# Patient Record
Sex: FEMALE | Race: WHITE | NOT HISPANIC OR LATINO | Employment: PART TIME | URBAN - METROPOLITAN AREA
[De-identification: names, ages, dates, MRNs, and addresses within clinical notes are randomized per-mention and may not be internally consistent; named-entity substitution may affect disease eponyms.]

---

## 2017-03-17 ENCOUNTER — OFFICE VISIT (OUTPATIENT)
Dept: MEDICAL GROUP | Age: 52
End: 2017-03-17
Payer: COMMERCIAL

## 2017-03-17 VITALS
BODY MASS INDEX: 28.04 KG/M2 | OXYGEN SATURATION: 98 % | SYSTOLIC BLOOD PRESSURE: 100 MMHG | DIASTOLIC BLOOD PRESSURE: 72 MMHG | WEIGHT: 185 LBS | HEART RATE: 87 BPM | TEMPERATURE: 97.5 F | HEIGHT: 68 IN

## 2017-03-17 DIAGNOSIS — R73.01 IFG (IMPAIRED FASTING GLUCOSE): ICD-10-CM

## 2017-03-17 DIAGNOSIS — K90.0 CELIAC SPRUE: ICD-10-CM

## 2017-03-17 DIAGNOSIS — E55.9 VITAMIN D DEFICIENCY: ICD-10-CM

## 2017-03-17 DIAGNOSIS — E03.9 HYPOTHYROIDISM, UNSPECIFIED TYPE: ICD-10-CM

## 2017-03-17 DIAGNOSIS — Z86.2 H/O IRON DEFICIENCY ANEMIA: ICD-10-CM

## 2017-03-17 DIAGNOSIS — Z79.899 HIGH RISK MEDICATION USE: ICD-10-CM

## 2017-03-17 DIAGNOSIS — Z13.220 SCREENING, LIPID: ICD-10-CM

## 2017-03-17 DIAGNOSIS — Z90.49 S/P TOTAL COLECTOMY: ICD-10-CM

## 2017-03-17 PROCEDURE — 99214 OFFICE O/P EST MOD 30 MIN: CPT | Performed by: FAMILY MEDICINE

## 2017-03-17 RX ORDER — LEVOTHYROXINE SODIUM 0.07 MG/1
TABLET ORAL
COMMUNITY
End: 2017-05-01

## 2017-03-17 ASSESSMENT — PATIENT HEALTH QUESTIONNAIRE - PHQ9: CLINICAL INTERPRETATION OF PHQ2 SCORE: 0

## 2017-03-17 NOTE — PROGRESS NOTES
Subjective:     Chief Complaint   Patient presents with   • Hypothyroidism     Evaluation      Shama Paula is a 52 y.o. female here today for issues listed below      1. Hypothyroidism, unspecified type - patient stopped taking thyroid medication around January because she was experiencing palpitations and was having fatigue. No change in fatigue with cessation of thyroid. Palpitations did resolve. She was previously on 75 µg of thyroid daily and about 2015. After that dose was increased. She was overtreated at 112 µg daily. Previous recommendation was to take 112 µg 6-1/2 days per week. She denies temperature intolerance. She has chronic bowel problems. Reports ongoing fatigue    2. Celiac sprue chronic condition. Has difficulty adhering to diet and notes no improvement whether she appears to diet or not. Is at risk for an deficiency because of celiac    3. H/O iron deficiency anemia . Denies any known bleeding. No vaginal bleeding because of hysterectomy. Has occasional blood in stool but not regularly.    4. S/P total colectomy for chronic bowel problems and celiac disease. No changes in bowels    5. IFG (impaired fasting glucose) identified on prior labs. No excessive thirst or urination. Not following a specific diet other than mostly gluten-free    6. Screening, lipid . Due for labs.    7. Vitamin D deficiency . Not taking supplement consistently.    8. High risk medication use- at risk for B12 deficiency because of hypothyroid.        Allergies: Dulcolax; Gluten meal; and Nkda  Current medicines none. Previously on levothyroxine 75 µg in 2015 and levothyroxine 112 µg in 2016  Current Outpatient Prescriptions   Medication Sig Dispense Refill   • levothyroxine (SYNTHROID) 75 MCG Tab Take  by mouth.       No current facility-administered medications for this visit.       She  has a past medical history of Tubal ligation; Celiac disease; Pancreatitis; Heart palpitations; Celiac sprue (5/12/2009); Colon polyp  "(5/12/2009); Preventative health care (5/12/2009); Anemia (5/12/2009); ENDOMETRIOSIS (5/12/2009); Ovarian cyst (5/12/2009); Recurrent UTI (5/12/2009); Pancreatic cyst (6/17/2009); Elevated TSH (12/8/2010); and Atrophic vaginitis (8/25/2015).  Health Maintenance: Mammogram is current  She is not exercising regularly   ROS  Constitutional: Negative for fever, chills/sweats   Respiratory: Negative for shortness of breath, BROWN  Cardiovascular: Negative for chest pain or pressure  GI/: Negative for urinary difficulty  All other systems reviewed and are negative except as per HPI.        Objective:     Blood pressure 100/72, pulse 87, temperature 36.4 °C (97.5 °F), height 1.727 m (5' 7.99\"), weight 83.915 kg (185 lb), last menstrual period 12/06/2012, SpO2 98 %. Body mass index is 28.14 kg/(m^2).  Physical Exam:  Alert, oriented in no acute distress.  Eye contact is good, speech goal directed, affect calm  HEENT: conjunctiva non-injected, sclera non-icteric.  Pinna normal without skin lesions. TM pearly gray.   Oral mucous membranes pink and moist with no lesions.  Neck No adenopathy or masses in the neck or supraclavicular regions.  No carotid bruits. No thyromegaly  Lungs: clear to auscultation bilaterally with good excursion.  CV: regular rate and rhythm.  Abdomen No CVAT  Ext: no edema, no tenderness to palpation over shins        Assessment and Plan:     Long discussion with patient. Discussed the palpitations were more likely due to myasthenia. Recommended in future stopping one medication at a time and communicating with provider prior to stopping any medication  Treatment Changes: Labs ordered. Will treat as indicated based on results.  Shama was seen today for hypothyroidism.    Diagnoses and all orders for this visit:    Hypothyroidism, unspecified type  Comments:  if TSH elevated, start thyroid 75 mcg daily and titrate from there. (overtreated on 112 mcg 6.5 days per week)  Orders:  -     TSH; Future  -     " FREE THYROXINE; Future    Celiac sprue  Comments:  Continue diet.   Orders:  -     FERRITIN; Future    H/O iron deficiency anemia  Comments:  at risk for chronic deficiency from celiac dz. check levels.  Orders:  -     CBC WITH DIFFERENTIAL; Future    S/P total colectomy    IFG (impaired fasting glucose)  Comments:  Monitor labs  Orders:  -     COMP METABOLIC PANEL; Future  -     INSULIN FASTING; Future  -     HEMOGLOBIN A1C; Future    Screening, lipid  Comments:  lab ordered  Orders:  -     LIPID PROFILE; Future    Vitamin D deficiency  Comments:  lab ordered  Orders:  -     VITAMIN D,25 HYDROXY; Future    High risk medication use  Comments:  risk for B12 defic from hypothyroid. Check labs  Orders:  -     MAGNESIUM; Future        Followup: Return in about 1 year (around 3/17/2018). sooner should new symptoms or problems arise.

## 2017-03-17 NOTE — MR AVS SNAPSHOT
"        Shama Paula   3/17/2017 7:30 AM   Office Visit   MRN: [id number]    Department:  [address] [place] [address]   Dept Phone:  [phone]    Description:  Female : 1965   Provider:  Luana Rm M.D.           Reason for Visit     Hypothyroidism Evaluation       Allergies as of 3/17/2017     Allergen Noted Reactions    Dulcolax 2012       Headaches from pill form medication    Gluten Meal 2009       Nkda [No Known Drug Allergy] 2009         You were diagnosed with     Hypothyroidism, unspecified type   [0356319]   if TSH elevated, start thyroid 75 mcg daily and titrate from there. (overtreated on 112 mcg 6.5 days per week)    Celiac sprue   [[id number]]   Continue diet.     H/O iron deficiency anemia   [813825]   at risk for chronic deficiency from celiac dz. check levels.    S/P total colectomy   [831970]       IFG (impaired fasting glucose)   [875311]   Monitor labs    Screening, lipid   [638459]   lab ordered    Vitamin D deficiency   [0125695]   lab ordered    High risk medication use   [388014]   risk for B12 defic from hypothyroid. Check labs      Vital Signs     Blood Pressure Pulse Temperature Height Weight Body Mass Index    100/72 mmHg 87 36.4 °C (97.5 °F) 1.727 m (5' 7.99\") 83.915 kg (185 lb) 28.14 kg/m2    Oxygen Saturation Last Menstrual Period Smoking Status             98% 2012 Former Smoker         Basic Information     Date Of Birth Sex Race Ethnicity Preferred Language    1965 Female White Non- English      Your appointments     2017  8:10 AM   Established Patient with Luana Rm M.D.   [address])    [address] [place] [id number] [phone]           You will be receiving a confirmation call a few days before your appointment from our automated call confirmation system.              Problem List              ICD-10-CM Priority Class Noted - Resolved    Anemia D64.9   2009 - Present    Celiac " sprue K90.0   5/12/2009 - Present    Recurrent UTI N39.0   5/12/2009 - Present    Colon polyp K63.5   5/12/2009 - Present    Preventative health care Z00.00   5/12/2009 - Present    History of pancreatitis Z87.19   5/12/2009 - Present    Pancreatic cyst K86.2   6/17/2009 - Present    Chronic constipation K59.09   7/15/2010 - Present    Hypothyroid E03.9   12/8/2010 - Present    S/P total hysterectomy and bilateral salpingo-oophorectomy Z90.710, Z90.79, Z90.722   3/23/2014 - Present    S/P total colectomy Z90.49   10/17/2014 - Present    Atrophic vaginitis N95.2   8/25/2015 - Present      Health Maintenance        Date Due Completion Dates    IMM DTaP/Tdap/Td Vaccine (1 - Tdap) 2/8/1986 ---    IMM INFLUENZA (1) 9/1/2016 11/2/2015, 9/5/2014, 10/20/2013, 9/27/2013    MAMMOGRAM 8/18/2017 8/18/2016, 5/6/2014, 9/22/2008, 9/22/2008            Current Immunizations     Influenza TIV (IM) 10/20/2013    Influenza Vaccine Quad Inj (Pf) 11/2/2015, 9/5/2014, 9/27/2013      Below and/or attached are the medications your provider expects you to take. Review all of your home medications and newly ordered medications with your provider and/or pharmacist. Follow medication instructions as directed by your provider and/or pharmacist. Please keep your medication list with you and share with your provider. Update the information when medications are discontinued, doses are changed, or new medications (including over-the-counter products) are added; and carry medication information at all times in the event of emergency situations     Allergies:  DULCOLAX - (reactions not documented)     GLUTEN MEAL - (reactions not documented)     NKDA - (reactions not documented)               Medications  Valid as of: March 17, 2017 -  9:52 AM    Generic Name Brand Name Tablet Size Instructions for use    Levothyroxine Sodium (Tab) SYNTHROID 75 MCG Take  by mouth.        .                 Medicines prescribed today were sent to:     [id number]  - [place], [place] - [address]    [address] [place] [address]    Phone: [phone] Fax: [phone]    Open 24 Hours?: No      Medication refill instructions:       If your prescription bottle indicates you have medication refills left, it is not necessary to call your provider’s office. Please contact your pharmacy and they will refill your medication.    If your prescription bottle indicates you do not have any refills left, you may request refills at any time through one of the following ways: The online [employer] system (except Urgent Care), by calling your provider’s office, or by asking your pharmacy to contact your provider’s office with a refill request. Medication refills are processed only during regular business hours and may not be available until the next business day. Your provider may request additional information or to have a follow-up visit with you prior to refilling your medication.   *Please Note: Medication refills are assigned a new Rx number when refilled electronically. Your pharmacy may indicate that no refills were authorized even though a new prescription for the same medication is available at the pharmacy. Please request the medicine by name with the pharmacy before contacting your provider for a refill.        Your To Do List     Future Labs/Procedures Complete By Expires    CBC WITH DIFFERENTIAL  As directed 3/18/2018    COMP METABOLIC PANEL  As directed 3/18/2018    FERRITIN  As directed 3/17/2018    FREE THYROXINE  As directed 3/18/2018    HEMOGLOBIN A1C  As directed 3/18/2018    INSULIN FASTING  As directed 3/17/2018    LIPID PROFILE  As directed 3/18/2018    MAGNESIUM  As directed 3/17/2018    TSH  As directed 3/18/2018    VITAMIN D,25 HYDROXY  As directed 3/18/2018      Instructions    Take OTC Vitamin B12 500 IU minimum every day.            [employer] Access Code: Activation code not generated  Current [employer] Status: Active

## 2017-03-22 ENCOUNTER — HOSPITAL ENCOUNTER (OUTPATIENT)
Dept: LAB | Facility: MEDICAL CENTER | Age: 52
End: 2017-03-22
Attending: FAMILY MEDICINE
Payer: COMMERCIAL

## 2017-03-22 DIAGNOSIS — E03.9 HYPOTHYROIDISM, UNSPECIFIED TYPE: ICD-10-CM

## 2017-03-22 DIAGNOSIS — Z13.220 SCREENING, LIPID: ICD-10-CM

## 2017-03-22 DIAGNOSIS — Z86.2 H/O IRON DEFICIENCY ANEMIA: ICD-10-CM

## 2017-03-22 DIAGNOSIS — E55.9 VITAMIN D DEFICIENCY: ICD-10-CM

## 2017-03-22 DIAGNOSIS — K90.0 CELIAC SPRUE: ICD-10-CM

## 2017-03-22 DIAGNOSIS — Z79.899 HIGH RISK MEDICATION USE: ICD-10-CM

## 2017-03-22 DIAGNOSIS — R73.01 IFG (IMPAIRED FASTING GLUCOSE): ICD-10-CM

## 2017-03-22 LAB
25(OH)D3 SERPL-MCNC: 23 NG/ML (ref 30–100)
ALBUMIN SERPL BCP-MCNC: 3.9 G/DL (ref 3.2–4.9)
ALBUMIN/GLOB SERPL: 1.5 G/DL
ALP SERPL-CCNC: 62 U/L (ref 30–99)
ALT SERPL-CCNC: 10 U/L (ref 2–50)
ANION GAP SERPL CALC-SCNC: 5 MMOL/L (ref 0–11.9)
AST SERPL-CCNC: 17 U/L (ref 12–45)
BASOPHILS # BLD AUTO: 1 % (ref 0–1.8)
BASOPHILS # BLD: 0.05 K/UL (ref 0–0.12)
BILIRUB SERPL-MCNC: 0.5 MG/DL (ref 0.1–1.5)
BUN SERPL-MCNC: 17 MG/DL (ref 8–22)
CALCIUM SERPL-MCNC: 9 MG/DL (ref 8.4–10.2)
CHLORIDE SERPL-SCNC: 105 MMOL/L (ref 96–112)
CHOLEST SERPL-MCNC: 143 MG/DL (ref 100–199)
CO2 SERPL-SCNC: 28 MMOL/L (ref 20–33)
CREAT SERPL-MCNC: 0.88 MG/DL (ref 0.5–1.4)
EOSINOPHIL # BLD AUTO: 0.19 K/UL (ref 0–0.51)
EOSINOPHIL NFR BLD: 3.9 % (ref 0–6.9)
ERYTHROCYTE [DISTWIDTH] IN BLOOD BY AUTOMATED COUNT: 42.2 FL (ref 35.9–50)
EST. AVERAGE GLUCOSE BLD GHB EST-MCNC: 117 MG/DL
FERRITIN SERPL-MCNC: 4.1 NG/ML (ref 10–291)
GFR SERPL CREATININE-BSD FRML MDRD: >60 ML/MIN/1.73 M 2
GLOBULIN SER CALC-MCNC: 2.6 G/DL (ref 1.9–3.5)
GLUCOSE SERPL-MCNC: 95 MG/DL (ref 65–99)
HBA1C MFR BLD: 5.7 % (ref 0–5.6)
HCT VFR BLD AUTO: 34.7 % (ref 37–47)
HDLC SERPL-MCNC: 62 MG/DL
HGB BLD-MCNC: 10.9 G/DL (ref 12–16)
IMM GRANULOCYTES # BLD AUTO: 0.01 K/UL (ref 0–0.11)
IMM GRANULOCYTES NFR BLD AUTO: 0.2 % (ref 0–0.9)
LDLC SERPL CALC-MCNC: 69 MG/DL
LYMPHOCYTES # BLD AUTO: 1.34 K/UL (ref 1–4.8)
LYMPHOCYTES NFR BLD: 27.4 % (ref 22–41)
MAGNESIUM SERPL-MCNC: 2.1 MG/DL (ref 1.5–2.5)
MCH RBC QN AUTO: 24.7 PG (ref 27–33)
MCHC RBC AUTO-ENTMCNC: 31.4 G/DL (ref 33.6–35)
MCV RBC AUTO: 78.5 FL (ref 81.4–97.8)
MONOCYTES # BLD AUTO: 0.5 K/UL (ref 0–0.85)
MONOCYTES NFR BLD AUTO: 10.2 % (ref 0–13.4)
NEUTROPHILS # BLD AUTO: 2.8 K/UL (ref 2–7.15)
NEUTROPHILS NFR BLD: 57.3 % (ref 44–72)
NRBC # BLD AUTO: 0 K/UL
NRBC BLD AUTO-RTO: 0 /100 WBC
PLATELET # BLD AUTO: 249 K/UL (ref 164–446)
PMV BLD AUTO: 10.4 FL (ref 9–12.9)
POTASSIUM SERPL-SCNC: 3.8 MMOL/L (ref 3.6–5.5)
PROT SERPL-MCNC: 6.5 G/DL (ref 6–8.2)
RBC # BLD AUTO: 4.42 M/UL (ref 4.2–5.4)
SODIUM SERPL-SCNC: 138 MMOL/L (ref 135–145)
T4 FREE SERPL-MCNC: 0.75 NG/DL (ref 0.58–1.64)
TRIGL SERPL-MCNC: 61 MG/DL (ref 0–149)
TSH SERPL DL<=0.005 MIU/L-ACNC: 7.48 UIU/ML (ref 0.35–5.5)
WBC # BLD AUTO: 4.9 K/UL (ref 4.8–10.8)

## 2017-03-22 PROCEDURE — 83036 HEMOGLOBIN GLYCOSYLATED A1C: CPT

## 2017-03-22 PROCEDURE — 36415 COLL VENOUS BLD VENIPUNCTURE: CPT

## 2017-03-22 PROCEDURE — 84443 ASSAY THYROID STIM HORMONE: CPT

## 2017-03-22 PROCEDURE — 84439 ASSAY OF FREE THYROXINE: CPT

## 2017-03-22 PROCEDURE — 80061 LIPID PANEL: CPT

## 2017-03-22 PROCEDURE — 82306 VITAMIN D 25 HYDROXY: CPT

## 2017-03-22 PROCEDURE — 85025 COMPLETE CBC W/AUTO DIFF WBC: CPT

## 2017-03-22 PROCEDURE — 83525 ASSAY OF INSULIN: CPT

## 2017-03-22 PROCEDURE — 82728 ASSAY OF FERRITIN: CPT

## 2017-03-22 PROCEDURE — 80053 COMPREHEN METABOLIC PANEL: CPT

## 2017-03-22 PROCEDURE — 83735 ASSAY OF MAGNESIUM: CPT

## 2017-03-23 LAB — INSULIN P FAST SERPL-ACNC: 10 UIU/ML (ref 3–19)

## 2017-05-01 ENCOUNTER — PATIENT MESSAGE (OUTPATIENT)
Dept: MEDICAL GROUP | Facility: PHYSICIAN GROUP | Age: 52
End: 2017-05-01

## 2017-05-01 DIAGNOSIS — E03.9 HYPOTHYROIDISM, UNSPECIFIED TYPE: ICD-10-CM

## 2017-05-01 RX ORDER — LEVOTHYROXINE SODIUM 0.05 MG/1
50 TABLET ORAL
Qty: 90 TAB | Refills: 3 | Status: SHIPPED | OUTPATIENT
Start: 2017-05-01 | End: 2018-12-03 | Stop reason: CLARIF

## 2017-05-09 ENCOUNTER — OFFICE VISIT (OUTPATIENT)
Dept: MEDICAL GROUP | Facility: LAB | Age: 52
End: 2017-05-09
Payer: COMMERCIAL

## 2017-05-09 VITALS
HEART RATE: 74 BPM | BODY MASS INDEX: 28.19 KG/M2 | OXYGEN SATURATION: 96 % | DIASTOLIC BLOOD PRESSURE: 76 MMHG | SYSTOLIC BLOOD PRESSURE: 118 MMHG | RESPIRATION RATE: 16 BRPM | WEIGHT: 186 LBS | HEIGHT: 68 IN | TEMPERATURE: 98.3 F

## 2017-05-09 DIAGNOSIS — D64.9 ANEMIA, UNSPECIFIED TYPE: ICD-10-CM

## 2017-05-09 DIAGNOSIS — E03.9 HYPOTHYROIDISM, UNSPECIFIED TYPE: ICD-10-CM

## 2017-05-09 DIAGNOSIS — R73.03 PREDIABETES: ICD-10-CM

## 2017-05-09 DIAGNOSIS — Z79.890 POSTMENOPAUSAL HRT (HORMONE REPLACEMENT THERAPY): ICD-10-CM

## 2017-05-09 DIAGNOSIS — K90.0 CELIAC SPRUE: ICD-10-CM

## 2017-05-09 DIAGNOSIS — E55.9 VITAMIN D INSUFFICIENCY: ICD-10-CM

## 2017-05-09 DIAGNOSIS — E61.1 IRON DEFICIENCY: ICD-10-CM

## 2017-05-09 DIAGNOSIS — Z83.3 FAMILY HISTORY OF DIABETES MELLITUS IN MOTHER: ICD-10-CM

## 2017-05-09 PROCEDURE — 99214 OFFICE O/P EST MOD 30 MIN: CPT | Performed by: FAMILY MEDICINE

## 2017-05-09 NOTE — PROGRESS NOTES
Subjective:      Shama Paula is a 52 y.o. female who presents with Establish Care    Chief Complaint   Patient presents with   • Establish Care     referral to endocrinology/ labs             HPI    Hypothyroidism   This is a chronic problem. Patient recently restarted taking Synthroid 50 µg one by mouth daily. She had labs done prior to her starting her medication again and her TSH was high at 7.48 and her free T4 was 0.75. She's been on this medication now for about a week     Iron deficiency, anemia  This is chronic. She is taking a MVI with iron and she has had this in the past. She has celiac disease. In the past she has taken too much iron and so she is not taking additional iron supplementation. Her most recent labs show a low ferritin at 4.1. Her hemoglobin is 10.9, hematocrit 34.7. Her MCV is low at 78.5.    Prediabetes   Patient has a family history of diabetes. Her most recent labs show an A1c of 5.7 and a fasting glucose of 95. Patient would like a referral to endocrinology, Dr Mendez     Postmenopausal, on HRT  Patient has a history of hysterectomy, oophorectomy back in 3/2014. She is currently on estradiol 1 mg daily. She has no history of an abnormal Pap smear.     Vitamin D insufficiency  This is a chronic problem. Patient's most recent vitamin D level is 23. She is not currently taking a vitamin D supplement. I recommend that she take one. She states that she does have some vitamin D in her multivitamin however she needs to have a little bit higher dose.    Patient brought in documentation of her medical history. This was reviewed and scanned into media.    Currently he is feeling well. No other complaints today.    Patient Active Problem List    Diagnosis Date Noted   • Atrophic vaginitis 08/25/2015   • S/P total colectomy  Due to motility issues she states  10/17/2014   • S/P total hysterectomy and bilateral salpingo-oophorectomy 03/23/2014   • Hypothyroid 12/08/2010   • Chronic constipation  07/15/2010   • Pancreatic cyst 06/17/2009   • Anemia 05/12/2009   • Celiac sprue  Patient was diagnosed with this at the age of 40.  05/12/2009   • Recurrent UTI 05/12/2009   • Colon polyp 05/12/2009   • Preventative health care 05/12/2009   • History of pancreatitis  She thinks this was gallstone pancreatitis as she subsequently underwent cholecystectomy  05/12/2009       Current outpatient prescriptions:   •  levothyroxine (SYNTHROID) 50 MCG Tab, Take 1 Tab by mouth Every morning on an empty stomach., Disp: 90 Tab, Rfl: 3      Family History   Problem Relation Age of Onset   • Diabetes Mother    • Arrythmia Mother      pacemaker   • Thyroid Father    • Arthritis Sister      RA       Social History     Social History   • Marital Status:      Spouse Name: N/A   • Number of Children: N/A   • Years of Education: N/A     Occupational History   • Not on file.     Social History Main Topics   • Smoking status: Former Smoker -- 5 years   • Smokeless tobacco: Never Used      Comment: in her 20's   • Alcohol Use: 0.0 oz/week     0 Standard drinks or equivalent per week      Comment: occ   • Drug Use: No   • Sexual Activity:     Partners: Male      Comment: tubal ligation     Other Topics Concern   • Not on file     Social History Narrative    2017: works part time  for          Past Surgical History   Procedure Laterality Date   • Gastroscopy with biopsy  5/11/2009     Performed by HARINDER VICTOR at SURGERY [facility] ORS   • Egd with asp/bx  5/11/2009     Performed by HARINDER VICTOR at SURGERY [facility] ORS   • Tubal ligation     • Cholecystectomy  2009   • Gyn surgery       2 x c section   • Primary c section     • Other       tonsillectomy   • Kathleen by laparoscopy  5/27/2009     Performed by JUSTINE MURPHY at SURGERY SAME DAY [facility] ORS   • Pr enlarge breast with implant  1989   • Colectomy  2014     [facility]       Objective:     /76 mmHg  Pulse 74  Temp(Src) 36.8  "°C (98.3 °F)  Resp 16  Ht 1.727 m (5' 7.99\")  Wt 84.369 kg (186 lb)  BMI 28.29 kg/m2  SpO2 96%  LMP 12/06/2012     Physical Exam   Constitutional: She appears well-developed and well-nourished. She is cooperative.  Non-toxic appearance. She does not have a sickly appearance. No distress.   HENT:   Head: Normocephalic and atraumatic.   Eyes: Conjunctivae and EOM are normal.   Neck: No thyromegaly present.   Cardiovascular: Normal rate, regular rhythm and normal heart sounds.    Pulmonary/Chest: Effort normal and breath sounds normal. No tachypnea. No respiratory distress. She has no decreased breath sounds. She has no wheezes. She has no rhonchi. She has no rales.   Abdominal: Soft. There is no tenderness. There is no rigidity, no rebound and no guarding.   Lymphadenopathy:     She has no cervical adenopathy.   Neurological: She is alert. She is not disoriented. She displays no tremor. She displays no seizure activity. Coordination and gait normal.   Skin: Skin is warm and dry. She is not diaphoretic.   Psychiatric: She has a normal mood and affect. Her speech is normal.         No visits with results within 1 Month(s) from this visit.  Latest known visit with results is:    Hospital Outpatient Visit on 03/22/2017   Component Date Value Ref Range Status   • Insulin Fasting 03/22/2017 10  3 - 19 uIU/mL Final    Comment: INTERPRETIVE INFORMATION: Insulin, Fasting  This test reacts on a nearly equimolar basis with the analogs  insulin aspart, insulin glargine, and insulin lispro.  Insulin  detemir exhibits approximately 50 percent cross-reactivity.  Test  reactivity with insulin glulisine is negligible (less than 3  percent). To convert to pmol/L, multiply uIU/mL by 6.0.  Performed by [facility],  [address] [place] [id number] [phone]  [email or web address], Vic Mckenzie MD - Lab. Director     • Ferritin 03/22/2017 4.1* 10.0 - 291.0 ng/mL Final   • Magnesium 03/22/2017 2.1  1.5 - 2.5 mg/dL Final   • WBC " 03/22/2017 4.9  4.8 - 10.8 K/uL Final   • RBC 03/22/2017 4.42  4.20 - 5.40 M/uL Final   • Hemoglobin 03/22/2017 10.9* 12.0 - 16.0 g/dL Final   • Hematocrit 03/22/2017 34.7* 37.0 - 47.0 % Final   • MCV 03/22/2017 78.5* 81.4 - 97.8 fL Final   • MCH 03/22/2017 24.7* 27.0 - 33.0 pg Final   • MCHC 03/22/2017 31.4* 33.6 - 35.0 g/dL Final   • RDW 03/22/2017 42.2  35.9 - 50.0 fL Final   • Platelet Count 03/22/2017 249  164 - 446 K/uL Final   • MPV 03/22/2017 10.4  9.0 - 12.9 fL Final   • Neutrophils-Polys 03/22/2017 57.30  44.00 - 72.00 % Final   • Lymphocytes 03/22/2017 27.40  22.00 - 41.00 % Final   • Monocytes 03/22/2017 10.20  0.00 - 13.40 % Final   • Eosinophils 03/22/2017 3.90  0.00 - 6.90 % Final   • Basophils 03/22/2017 1.00  0.00 - 1.80 % Final   • Immature Granulocytes 03/22/2017 0.20  0.00 - 0.90 % Final   • Nucleated RBC 03/22/2017 0.00   Final   • Neutrophils (Absolute) 03/22/2017 2.80  2.00 - 7.15 K/uL Final    Includes immature neutrophils, if present.   • Lymphs (Absolute) 03/22/2017 1.34  1.00 - 4.80 K/uL Final   • Monos (Absolute) 03/22/2017 0.50  0.00 - 0.85 K/uL Final   • Eos (Absolute) 03/22/2017 0.19  0.00 - 0.51 K/uL Final   • Baso (Absolute) 03/22/2017 0.05  0.00 - 0.12 K/uL Final   • Immature Granulocytes (abs) 03/22/2017 0.01  0.00 - 0.11 K/uL Final   • NRBC (Absolute) 03/22/2017 0.00   Final   • Sodium 03/22/2017 138  135 - 145 mmol/L Final   • Potassium 03/22/2017 3.8  3.6 - 5.5 mmol/L Final   • Chloride 03/22/2017 105  96 - 112 mmol/L Final   • Co2 03/22/2017 28  20 - 33 mmol/L Final   • Anion Gap 03/22/2017 5.0  0.0 - 11.9 Final   • Glucose 03/22/2017 95  65 - 99 mg/dL Final   • Bun 03/22/2017 17  8 - 22 mg/dL Final   • Creatinine 03/22/2017 0.88  0.50 - 1.40 mg/dL Final   • Calcium 03/22/2017 9.0  8.4 - 10.2 mg/dL Final   • AST(SGOT) 03/22/2017 17  12 - 45 U/L Final   • ALT(SGPT) 03/22/2017 10  2 - 50 U/L Final   • Alkaline Phosphatase 03/22/2017 62  30 - 99 U/L Final   • Total Bilirubin  03/22/2017 0.5  0.1 - 1.5 mg/dL Final   • Albumin 03/22/2017 3.9  3.2 - 4.9 g/dL Final   • Total Protein 03/22/2017 6.5  6.0 - 8.2 g/dL Final   • Globulin 03/22/2017 2.6  1.9 - 3.5 g/dL Final   • A-G Ratio 03/22/2017 1.5   Final   • Glycohemoglobin 03/22/2017 5.7* 0.0 - 5.6 % Final    Comment: Increased risk for diabetes:  5.7 -6.4%  Diabetes:  >6.4%  Glycemic control for adults with diabetes:  <7.0%  The above interpretations are per ADA guidelines.  Diagnosis  of diabetes mellitus on the basis of elevated Hemoglobin A1c  should be confirmed by repeating the Hb A1c test.     • Est Avg Glucose 03/22/2017 117   Final    Comment: The eAG calculation is based on the A1c-Derived Daily Glucose  (ADAG) study.  See the ADA's website for additional information.     • Cholesterol,Tot 03/22/2017 143  100 - 199 mg/dL Final   • Triglycerides 03/22/2017 61  0 - 149 mg/dL Final   • HDL 03/22/2017 62  >=40 mg/dL Final   • LDL 03/22/2017 69  <100 mg/dL Final   • TSH 03/22/2017 7.480* 0.350 - 5.500 uIU/mL Final   • Free T-4 03/22/2017 0.75  0.58 - 1.64 ng/dL Final   • 25-Hydroxy   Vitamin D 25 03/22/2017 23* 30 - 100 ng/mL Final    Comment: Adult Ranges:   <20 ng/mL - Deficiency  20-29 ng/mL - Insufficiency   ng/mL - Sufficiency  The [employer]aur Vitamin D Assay is standardized to the  [facility] reference measurement procedures, a  reference method for the Vitamin D Standardization Program  (VDSP).  The VDSP aligns patient results among 25 (OH)  Vitamin D methods.     • GFR If  03/22/2017 >60  >60 mL/min/1.73 m 2 Final   • GFR If Non  03/22/2017 >60  >60 mL/min/1.73 m 2 Final              Assessment/Plan:     1. Hypothyroidism, unspecified type  Patient recently restarted her Synthroid 15 µg by mouth daily. She is to continue on this medication. She is to check labs in 2-3 months  - REFERRAL TO ENDOCRINOLOGY  - TSH; Future  - FREE THYROXINE; Future    2. Vitamin D  insufficiency  Recommend she increase her vitamin D level to at least 3-4000 IUs daily. Check labs again in 3 months.  - VITAMIN D,25 HYDROXY; Future    3. Family history of diabetes mellitus in mother  He should've a medical referral to endocrinology. This has been completed.  - REFERRAL TO ENDOCRINOLOGY    4. Prediabetes  We discussed this today. We'll plan to recheck her A1c in 3 months  - REFERRAL TO ENDOCRINOLOGY    5. Anemia, unspecified type  Continue with multivitamin. Check CBC in 3 months  - CBC WITH DIFFERENTIAL; Future    6. Iron deficiency  Patient states that in the past she has overdosed and had too much iron so she does not want to take supplemental iron. Check CBC again in 3 months. She would like to continue her multivitamin with iron  - IRON/TOTAL IRON BIND; Future  - FERRITIN; Future    7. Celiac sprshanta Mixon    Reviewed patient's health summary that she brought into the clinic today. This was scanned into media.

## 2017-05-09 NOTE — MR AVS SNAPSHOT
"        Shama Paula   2017 9:20 AM   Office Visit   MRN: [id number]    Department:  [facility]   Dept Phone:  [phone]    Description:  Female : 1965   Provider:  Anca Rodríguez M.D.           Reason for Visit     Establish Care referral to endocrinology/ labs      Allergies as of 2017     Allergen Noted Reactions    Dulcolax 2012       Headaches from pill form medication    Gluten Meal 2009       Nkda [No Known Drug Allergy] 2009         You were diagnosed with     Hypothyroidism, unspecified type   [7122839]       Vitamin D insufficiency   [136187]       Family history of diabetes mellitus in mother   [2905357]       Prediabetes   [391141]       Anemia, unspecified type   [6657134]       Iron deficiency   [377815]       Celiac sprue   [423194]         Vital Signs     Blood Pressure Pulse Temperature Respirations Height Weight    118/76 mmHg 74 36.8 °C (98.3 °F) 16 1.727 m (5' 7.99\") 84.369 kg (186 lb)    Body Mass Index Oxygen Saturation Last Menstrual Period Smoking Status          28.29 kg/m2 96% 2012 Former Smoker        Basic Information     Date Of Birth Sex Race Ethnicity Preferred Language    1965 Female White Non- English      Your appointments     Sep 12, 2017 10:20 AM   Established Patient with Anca Rodríguez M.D.   [facility] (--)    [address] [address] [id number] [phone]           You will be receiving a confirmation call a few days before your appointment from our automated call confirmation system.              Problem List              ICD-10-CM Priority Class Noted - Resolved    Anemia D64.9   2009 - Present    Celiac sprue K90.0   2009 - Present    Recurrent UTI N39.0   2009 - Present    Colon polyp K63.5   2009 - Present    Preventative health care Z00.00   2009 - Present    History of pancreatitis Z87.19   2009 - Present    Pancreatic cyst K86.2   " 6/17/2009 - Present    Chronic constipation K59.09   7/15/2010 - Present    Hypothyroid E03.9   12/8/2010 - Present    S/P total hysterectomy and bilateral salpingo-oophorectomy Z90.710, Z90.79, Z90.722   3/23/2014 - Present    S/P total colectomy Z90.49   10/17/2014 - Present    Atrophic vaginitis N95.2   8/25/2015 - Present    Vitamin D insufficiency E55.9   5/9/2017 - Present      Health Maintenance        Date Due Completion Dates    IMM DTaP/Tdap/Td Vaccine (1 - Tdap) 2/8/1986 ---    MAMMOGRAM 8/18/2017 8/18/2016, 5/6/2014, 9/22/2008, 9/22/2008            Current Immunizations     Influenza TIV (IM) 10/20/2013    Influenza Vaccine Quad Inj (Pf) 11/2/2015, 9/5/2014, 9/27/2013      Below and/or attached are the medications your provider expects you to take. Review all of your home medications and newly ordered medications with your provider and/or pharmacist. Follow medication instructions as directed by your provider and/or pharmacist. Please keep your medication list with you and share with your provider. Update the information when medications are discontinued, doses are changed, or new medications (including over-the-counter products) are added; and carry medication information at all times in the event of emergency situations     Allergies:  DULCOLAX - (reactions not documented)     GLUTEN MEAL - (reactions not documented)     NKDA - (reactions not documented)               Medications  Valid as of: May 09, 2017 - 10:01 AM    Generic Name Brand Name Tablet Size Instructions for use    Levothyroxine Sodium (Tab) SYNTHROID 50 MCG Take 1 Tab by mouth Every morning on an empty stomach.        .                 Medicines prescribed today were sent to:     [id number] - [place] - [address]    [address] [address]    Phone: [phone] Fax: [phone]    Open 24 Hours?: No      Medication refill instructions:       If your prescription bottle indicates you have medication refills  left, it is not necessary to call your provider’s office. Please contact your pharmacy and they will refill your medication.    If your prescription bottle indicates you do not have any refills left, you may request refills at any time through one of the following ways: The online Acusphere system (except Urgent Care), by calling your provider’s office, or by asking your pharmacy to contact your provider’s office with a refill request. Medication refills are processed only during regular business hours and may not be available until the next business day. Your provider may request additional information or to have a follow-up visit with you prior to refilling your medication.   *Please Note: Medication refills are assigned a new Rx number when refilled electronically. Your pharmacy may indicate that no refills were authorized even though a new prescription for the same medication is available at the pharmacy. Please request the medicine by name with the pharmacy before contacting your provider for a refill.        Your To Do List     Future Labs/Procedures Complete By Expires    CBC WITH DIFFERENTIAL  As directed 5/9/2018    FERRITIN  As directed 5/9/2018    FREE THYROXINE  As directed 5/9/2018    IRON/TOTAL IRON BIND  As directed 5/9/2018    TSH  As directed 5/9/2018    VITAMIN D,25 HYDROXY  As directed 5/9/2018      Referral     A referral request has been sent to our patient care coordination department. Please allow 3-5 business days for us to process this request and contact you either by phone or mail. If you do not hear from us by the 5th business day, please call us at [phone].           Acusphere Access Code: Activation code not generated  Current Acusphere Status: Active

## 2018-05-17 ENCOUNTER — OFFICE VISIT (OUTPATIENT)
Dept: URGENT CARE | Facility: CLINIC | Age: 53
End: 2018-05-17
Payer: COMMERCIAL

## 2018-05-17 VITALS
TEMPERATURE: 97.7 F | HEIGHT: 67 IN | RESPIRATION RATE: 16 BRPM | BODY MASS INDEX: 31.08 KG/M2 | HEART RATE: 70 BPM | SYSTOLIC BLOOD PRESSURE: 124 MMHG | WEIGHT: 198 LBS | OXYGEN SATURATION: 98 % | DIASTOLIC BLOOD PRESSURE: 78 MMHG

## 2018-05-17 DIAGNOSIS — H92.01 OTALGIA, RIGHT EAR: ICD-10-CM

## 2018-05-17 DIAGNOSIS — H61.21 IMPACTED CERUMEN OF RIGHT EAR: ICD-10-CM

## 2018-05-17 PROCEDURE — 69210 REMOVE IMPACTED EAR WAX UNI: CPT | Performed by: NURSE PRACTITIONER

## 2018-05-21 ASSESSMENT — ENCOUNTER SYMPTOMS
FEVER: 0
COUGH: 0
RHINORRHEA: 0

## 2018-05-21 NOTE — PROGRESS NOTES
Subjective:      Shama Paula is a 53 y.o. female who presents with Otalgia (x4days both ears painful, plugged ringing)            Otalgia    There is pain in the right ear. This is a new problem. Episode onset: Pt reports development of pain in right ear about 4 days ago. She states both ears feel plugged but the right ear has more pain and ringing than the left. She has only taken Tylenol for the pain with little relief. Denies fever. The problem occurs constantly. The problem has been unchanged. Pertinent negatives include no coughing, ear discharge or rhinorrhea. The treatment provided no relief. There is no history of a chronic ear infection or hearing loss.       Review of Systems   Constitutional: Negative for fever.   HENT: Positive for ear pain. Negative for ear discharge and rhinorrhea.    Respiratory: Negative for cough.    All other systems reviewed and are negative.    Past Medical History:   Diagnosis Date   • Anemia 5/12/2009   • Atrophic vaginitis 8/25/2015    Sees Greg.    • Celiac disease    • Celiac sprue 5/12/2009   • Colon polyp 5/12/2009   • Elevated TSH 12/8/2010   • ENDOMETRIOSIS 5/12/2009   • Heart palpitations     COMES ON WITH STRESS   • Ovarian cyst 5/12/2009   • Pancreatic cyst 6/17/2009   • Pancreatitis      RECENT HOSP STAY  DC ON 5/4   • Preventative health care 5/12/2009   • Recurrent UTI 5/12/2009   • Tubal ligation       Past Surgical History:   Procedure Laterality Date   • COLECTOMY  2014    [facility]   • JESSEI BY LAPAROSCOPY  5/27/2009    Performed by JUSTINE MURPHY at SURGERY SAME DAY [facility]   • GASTROSCOPY WITH BIOPSY  5/11/2009    Performed by HARINDER VICTOR at SURGERY [facility]   • EGD WITH ASP/BX  5/11/2009    Performed by HARINDER VICTOR at SURGERY [facility]   • CHOLECYSTECTOMY  2009   • PB ENLARGE BREAST WITH IMPLANT  1989   • GYN SURGERY      2 x c section   • OTHER      tonsillectomy   • PRIMARY C SECTION     • TUBAL LIGATION        Social  "History     Social History   • Marital status:      Spouse name: N/A   • Number of children: N/A   • Years of education: N/A     Occupational History   • Not on file.     Social History Main Topics   • Smoking status: Former Smoker     Years: 5.00   • Smokeless tobacco: Never Used      Comment: in her 20's   • Alcohol use 0.0 oz/week      Comment: occ   • Drug use: No   • Sexual activity: Yes     Partners: Male      Comment: tubal ligation     Other Topics Concern   • Not on file     Social History Narrative    2017: works part time  for           Objective:     /78   Pulse 70   Temp 36.5 °C (97.7 °F)   Resp 16   Ht 1.702 m (5' 7\")   Wt 89.8 kg (198 lb)   LMP 12/06/2012   SpO2 98%   Breastfeeding? No   BMI 31.01 kg/m²      Physical Exam   Constitutional: She is oriented to person, place, and time. Vital signs are normal. She appears well-developed and well-nourished.   HENT:   Head: Normocephalic and atraumatic.   Right Ear: External ear normal.   Left Ear: Tympanic membrane and external ear normal.   Cannot visualize right TM due to cerumen impaction   Eyes: EOM are normal. Pupils are equal, round, and reactive to light.   Neck: Normal range of motion.   Cardiovascular: Normal rate and regular rhythm.    Pulmonary/Chest: Effort normal.   Musculoskeletal: Normal range of motion.   Lymphadenopathy:     She has no cervical adenopathy.   Neurological: She is alert and oriented to person, place, and time.   Skin: Skin is warm and dry. Capillary refill takes less than 2 seconds.   Psychiatric: She has a normal mood and affect. Her speech is normal and behavior is normal. Thought content normal.   Vitals reviewed.         Procedure: Cerumen Removal  Risks and benefits of procedure discussed  Majority of cerumen removed with curette and lavage after softening agent instilled. Can visualize small portion of right TM, does not appear to be infected  Patient tolerated well       "   Assessment/Plan:     1. Otalgia, right ear    2. Impacted cerumen of right ear    Advised pt the cerumen that remains in right ear canal is very dry and tenacious. I have advised her to obtain OTC softening agent and administer daily for the next few days to soften cerumen and remove it successfully. She understands.  In addition to this, I would like her to begin taking OTC Zyrtec daily for at least 2 weeks to help encourage drainage from ears and improve sensation of pressure  Also, flonase twice daily for one week  Tylenol and ibuprofen as needed for pain  RTC if ear pain in right ear becomes worse or if she develops fever  Supportive care, differential diagnoses, and indications for immediate follow-up discussed with patient.    Pathogenesis of diagnosis discussed including typical length and natural progression.      Instructed to return to  or nearest emergency department if symptoms fail to improve, for any change in condition, further concerns, or new concerning symptoms.  Patient states understanding of the plan of care and discharge instructions.

## 2018-12-03 ENCOUNTER — HOSPITAL ENCOUNTER (EMERGENCY)
Facility: MEDICAL CENTER | Age: 53
End: 2018-12-03
Attending: EMERGENCY MEDICINE
Payer: COMMERCIAL

## 2018-12-03 ENCOUNTER — APPOINTMENT (OUTPATIENT)
Dept: RADIOLOGY | Facility: MEDICAL CENTER | Age: 53
End: 2018-12-03
Attending: EMERGENCY MEDICINE
Payer: COMMERCIAL

## 2018-12-03 VITALS
SYSTOLIC BLOOD PRESSURE: 182 MMHG | RESPIRATION RATE: 18 BRPM | OXYGEN SATURATION: 99 % | HEIGHT: 68 IN | TEMPERATURE: 98.2 F | HEART RATE: 68 BPM | BODY MASS INDEX: 31.17 KG/M2 | WEIGHT: 205.69 LBS | DIASTOLIC BLOOD PRESSURE: 82 MMHG

## 2018-12-03 DIAGNOSIS — R07.9 CHEST PAIN, UNSPECIFIED TYPE: ICD-10-CM

## 2018-12-03 DIAGNOSIS — F41.9 ANXIETY: ICD-10-CM

## 2018-12-03 DIAGNOSIS — F22 PARANOIA (HCC): ICD-10-CM

## 2018-12-03 LAB
ALBUMIN SERPL BCP-MCNC: 4.2 G/DL (ref 3.2–4.9)
ALBUMIN/GLOB SERPL: 1.5 G/DL
ALP SERPL-CCNC: 73 U/L (ref 30–99)
ALT SERPL-CCNC: 13 U/L (ref 2–50)
AMPHETAMINES UR QL: NEGATIVE
ANION GAP SERPL CALC-SCNC: 9 MMOL/L (ref 0–11.9)
APTT PPP: 23.6 SEC (ref 24.7–36)
AST SERPL-CCNC: 21 U/L (ref 12–45)
BARBITURATES UR QL SCN: NEGATIVE
BASOPHILS # BLD AUTO: 0.8 % (ref 0–1.8)
BASOPHILS # BLD: 0.05 K/UL (ref 0–0.12)
BENZODIAZ UR QL SCN: POSITIVE
BILIRUB SERPL-MCNC: 0.5 MG/DL (ref 0.1–1.5)
BNP SERPL-MCNC: 22 PG/ML (ref 0–100)
BUN SERPL-MCNC: 18 MG/DL (ref 8–22)
BZE UR QL SCN: NEGATIVE
CALCIUM SERPL-MCNC: 8.8 MG/DL (ref 8.4–10.2)
CHLORIDE SERPL-SCNC: 105 MMOL/L (ref 96–112)
CO2 SERPL-SCNC: 21 MMOL/L (ref 20–33)
CREAT SERPL-MCNC: 0.98 MG/DL (ref 0.5–1.4)
EOSINOPHIL # BLD AUTO: 0.17 K/UL (ref 0–0.51)
EOSINOPHIL NFR BLD: 2.6 % (ref 0–6.9)
ERYTHROCYTE [DISTWIDTH] IN BLOOD BY AUTOMATED COUNT: 38.1 FL (ref 35.9–50)
ETHANOL BLD-MCNC: 0 G/DL
ETHANOL BLD-MCNC: 0 G/DL
GLOBULIN SER CALC-MCNC: 2.8 G/DL (ref 1.9–3.5)
GLUCOSE SERPL-MCNC: 135 MG/DL (ref 65–99)
HCT VFR BLD AUTO: 37.5 % (ref 37–47)
HGB BLD-MCNC: 12 G/DL (ref 12–16)
IMM GRANULOCYTES # BLD AUTO: 0.02 K/UL (ref 0–0.11)
IMM GRANULOCYTES NFR BLD AUTO: 0.3 % (ref 0–0.9)
INR PPP: 1.03 (ref 0.87–1.13)
LIPASE SERPL-CCNC: 33 U/L (ref 7–58)
LYMPHOCYTES # BLD AUTO: 1.45 K/UL (ref 1–4.8)
LYMPHOCYTES NFR BLD: 22.3 % (ref 22–41)
MCH RBC QN AUTO: 26.1 PG (ref 27–33)
MCHC RBC AUTO-ENTMCNC: 32 G/DL (ref 33.6–35)
MCV RBC AUTO: 81.7 FL (ref 81.4–97.8)
MONOCYTES # BLD AUTO: 0.66 K/UL (ref 0–0.85)
MONOCYTES NFR BLD AUTO: 10.2 % (ref 0–13.4)
NEUTROPHILS # BLD AUTO: 4.14 K/UL (ref 2–7.15)
NEUTROPHILS NFR BLD: 63.8 % (ref 44–72)
NRBC # BLD AUTO: 0 K/UL
NRBC BLD-RTO: 0 /100 WBC
PCP UR QL SCN: NEGATIVE
PLATELET # BLD AUTO: 226 K/UL (ref 164–446)
PMV BLD AUTO: 10.9 FL (ref 9–12.9)
POTASSIUM SERPL-SCNC: 3.1 MMOL/L (ref 3.6–5.5)
PROT SERPL-MCNC: 7 G/DL (ref 6–8.2)
PROTHROMBIN TIME: 13.4 SEC (ref 12–14.6)
RBC # BLD AUTO: 4.59 M/UL (ref 4.2–5.4)
SODIUM SERPL-SCNC: 135 MMOL/L (ref 135–145)
TROPONIN I SERPL-MCNC: <0.02 NG/ML (ref 0–0.04)
TROPONIN I SERPL-MCNC: <0.02 NG/ML (ref 0–0.04)
UR OPIATES 2659: NEGATIVE
UR THC 2511T: NEGATIVE
WBC # BLD AUTO: 6.5 K/UL (ref 4.8–10.8)

## 2018-12-03 PROCEDURE — 85610 PROTHROMBIN TIME: CPT

## 2018-12-03 PROCEDURE — 71045 X-RAY EXAM CHEST 1 VIEW: CPT

## 2018-12-03 PROCEDURE — 85730 THROMBOPLASTIN TIME PARTIAL: CPT

## 2018-12-03 PROCEDURE — 83690 ASSAY OF LIPASE: CPT

## 2018-12-03 PROCEDURE — 80305 DRUG TEST PRSMV DIR OPT OBS: CPT

## 2018-12-03 PROCEDURE — 83880 ASSAY OF NATRIURETIC PEPTIDE: CPT

## 2018-12-03 PROCEDURE — 36415 COLL VENOUS BLD VENIPUNCTURE: CPT

## 2018-12-03 PROCEDURE — 84484 ASSAY OF TROPONIN QUANT: CPT | Mod: 91

## 2018-12-03 PROCEDURE — 80053 COMPREHEN METABOLIC PANEL: CPT

## 2018-12-03 PROCEDURE — 99284 EMERGENCY DEPT VISIT MOD MDM: CPT

## 2018-12-03 PROCEDURE — 93005 ELECTROCARDIOGRAM TRACING: CPT | Performed by: EMERGENCY MEDICINE

## 2018-12-03 PROCEDURE — 85025 COMPLETE CBC W/AUTO DIFF WBC: CPT

## 2018-12-03 PROCEDURE — 80307 DRUG TEST PRSMV CHEM ANLYZR: CPT

## 2018-12-03 ASSESSMENT — ENCOUNTER SYMPTOMS
FEVER: 0
PALPITATIONS: 0
NERVOUS/ANXIOUS: 1
CHILLS: 0
DIZZINESS: 0
ABDOMINAL PAIN: 0
BACK PAIN: 0
NAUSEA: 1
HEADACHES: 0
DEPRESSION: 1
VOMITING: 0

## 2018-12-03 ASSESSMENT — PAIN DESCRIPTION - DESCRIPTORS: DESCRIPTORS: STABBING;SHARP

## 2018-12-03 ASSESSMENT — PAIN SCALES - GENERAL: PAINLEVEL_OUTOF10: 7

## 2018-12-03 ASSESSMENT — LIFESTYLE VARIABLES: SUBSTANCE_ABUSE: 0

## 2018-12-03 NOTE — DISCHARGE INSTRUCTIONS
Follow-up with the primary care provider, that has been assigned to you.  An appointment has been made.  See your discharge papers.

## 2018-12-03 NOTE — ED NOTES
Discharge instructions provided.  Pt verbalized the understanding of discharge instructions to follow up with PCP and to return to ER if condition worsens. No prescription.  Pt ambulated out of ER without difficulty.

## 2018-12-03 NOTE — ED NOTES
Pt c/o intermittent chest pain which was triggered by anxiety and stress  this morning at 0700. EKG cleared. IV established. Blood sent to lab. ERP at bedside. Pt agrees with plan of care discussed by ERP. AIDET acknowledged with patient. Mauro in low position, side rail up for pt safety. Call light within reach. Will continue to monitor.
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
## 2018-12-18 ENCOUNTER — OFFICE VISIT (OUTPATIENT)
Dept: MEDICAL GROUP | Facility: MEDICAL CENTER | Age: 53
End: 2018-12-18
Payer: COMMERCIAL

## 2018-12-18 VITALS
HEIGHT: 68 IN | DIASTOLIC BLOOD PRESSURE: 74 MMHG | OXYGEN SATURATION: 97 % | TEMPERATURE: 97.6 F | HEART RATE: 87 BPM | BODY MASS INDEX: 29.55 KG/M2 | WEIGHT: 195 LBS | RESPIRATION RATE: 16 BRPM | SYSTOLIC BLOOD PRESSURE: 120 MMHG

## 2018-12-18 DIAGNOSIS — F41.9 ACUTE ANXIETY: ICD-10-CM

## 2018-12-18 DIAGNOSIS — R41.82 ALTERED MENTAL STATUS, UNSPECIFIED ALTERED MENTAL STATUS TYPE: ICD-10-CM

## 2018-12-18 DIAGNOSIS — F22 PARANOIA (HCC): ICD-10-CM

## 2018-12-18 DIAGNOSIS — F99 PSYCHIATRIC PROBLEM: ICD-10-CM

## 2018-12-18 LAB — EKG IMPRESSION: NORMAL

## 2018-12-18 PROCEDURE — 99214 OFFICE O/P EST MOD 30 MIN: CPT | Performed by: NURSE PRACTITIONER

## 2018-12-18 ASSESSMENT — PATIENT HEALTH QUESTIONNAIRE - PHQ9: CLINICAL INTERPRETATION OF PHQ2 SCORE: 0

## 2018-12-18 NOTE — ASSESSMENT & PLAN NOTE
Patient is here today to follow-up on recent ER visit for acute anxiety and paranoia.  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  Her father passed away in February.  Around March she began feeling like she was being followed or that there was something going on with the security cameras at her workplace ([employer]).  She has since quit this job that her symptoms have become increasingly problematic.  She feels she is being watched, followed, she is afraid to leave the house and do typical daily activities such as grocery shopping.  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  , who is here with her today, initially thought that there was some validity to her concerns but in the past few months has come to realize that her concerns have become more unreasonable.  He is not seeing any activity which correlates with her feeling of being followed/watched.  She is not sleeping well, typically only 4 hours at night.  Her appetite has been decreased.  She feels anxious much of the time.  She voices concerns that someone will hurt her family.  She reports history of postpartum depression approximately 20 years ago, was prescribed Prozac at the time which she took only short-term.  Felt that it caused memory difficulty.  Denies any family history of psychiatric illness  Denies frequent headaches, substance abuse, auditory or visual hallucinations.  Denies history of manic behavior.  Denies self injury, si/hi

## 2018-12-18 NOTE — PROGRESS NOTES
Subjective:     Chief Complaint   Patient presents with   • Establish Care     NO CONCERNS     Shama Paula is a 53 y.o. female here today to establish care.  She was previously seen by Dr. Rodríguez who is no longer with medical group.  We discussed:    Paranoia (HCC)  Patient is here today to follow-up on recent ER visit for acute anxiety and paranoia.  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  Her father passed away in February.  Around March she began feeling like she was being followed or that there was something going on with the security cameras at her workplace ([employer]).  She has since quit this job that her symptoms have become increasingly problematic.  She feels she is being watched, followed, she is afraid to leave the house and do typical daily activities such as grocery shopping.  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  , who is here with her today, initially thought that there was some validity to her concerns but in the past few months has come to realize that her concerns have become more unreasonable.  He is not seeing any activity which correlates with her feeling of being followed/watched.  She is not sleeping well, typically only 4 hours at night.  Her appetite has been decreased.  She feels anxious much of the time.  She voices concerns that someone will hurt her family.  She reports history of postpartum depression approximately 20 years ago, was prescribed Prozac at the time which she took only short-term.  Felt that it caused memory difficulty.  Denies any family history of psychiatric illness  Denies frequent headaches, substance abuse, auditory or visual hallucinations.  Denies history of manic behavior.  Denies self injury, si/hi       Current medicines (including changes today)  No current outpatient prescriptions on  "file.     No current facility-administered medications for this visit.      She  has a past medical history of Anemia (5/12/2009); Atrophic vaginitis (8/25/2015); Celiac disease; Celiac sprue (5/12/2009); Colon polyp (5/12/2009); Elevated TSH (12/8/2010); ENDOMETRIOSIS (5/12/2009); Heart palpitations; Ovarian cyst (5/12/2009); Pancreatic cyst (6/17/2009); Pancreatitis; Preventative health care (5/12/2009); Recurrent UTI (5/12/2009); and Tubal ligation.    ROS included above     Objective:     Blood pressure 120/74, pulse 87, temperature 36.4 °C (97.6 °F), temperature source Temporal, resp. rate 16, height 1.727 m (5' 8\"), weight 88.5 kg (195 lb), last menstrual period 12/06/2012, SpO2 97 %, not currently breastfeeding. Body mass index is 29.65 kg/m².     Physical Exam:  General: Alert, oriented in no acute distress.  Eye contact is good, speech is normal, affect calm  HEENT: Oral mucosa pink moist, no lesions. TMs gray with good landmarks bilaterally. No lymphadenopathy.  Lungs: clear to auscultation bilaterally, normal effort, no wheeze/ rhonchi/ rales.  CV: regular rate and rhythm, S1, S2, no murmur  Abdomen: soft, nontender  Ext: no edema, color normal, vascularity normal, temperature normal    Assessment and Plan:   The following treatment plan was discussed   1. Paranoia (HCC)   new presentation of paranoia gradually worsening over the last 6 months.  I think the patient would benefit from expedited behavioral health evaluation and psychiatric consult, I have spoken to Gilma,  for the behavioral health program.  She will have initial assessment appointment tomorrow at 2 PM.  Patient and  are in agreement with this plan.  ER precautions reviewed.  Labs as listed below.  REFERRAL TO BEHAVIORAL HEALTH   2. Psychiatric problem  REFERRAL TO BEHAVIORAL HEALTH   3. Acute anxiety  TSH WITH REFLEX TO FT4    REFERRAL TO BEHAVIORAL HEALTH   4. Altered mental status, unspecified altered mental " status type  TSH WITH REFLEX TO FT4    CBC WITH DIFFERENTIAL    SERUM PROTEIN ELECTROPHORESIS    GARY REFLEXIVE PROFILE    COMP METABOLIC PANEL    REFERRAL TO BEHAVIORAL HEALTH       Followup: pending labs and tomorrow as scheduled         Please note that this dictation was created using voice recognition software. I have worked with consultants from the vendor as well as technical experts from [facility] to optimize the interface. I have made every reasonable attempt to correct obvious errors, but I expect that there are errors of grammar and possibly content that I did not discover before finalizing the note.

## 2018-12-19 ENCOUNTER — HOSPITAL ENCOUNTER (OUTPATIENT)
Dept: BEHAVIORAL HEALTH | Facility: MEDICAL CENTER | Age: 53
End: 2018-12-19
Attending: PSYCHIATRY & NEUROLOGY
Payer: COMMERCIAL

## 2018-12-19 ENCOUNTER — HOSPITAL ENCOUNTER (OUTPATIENT)
Dept: LAB | Facility: MEDICAL CENTER | Age: 53
End: 2018-12-19
Attending: NURSE PRACTITIONER
Payer: COMMERCIAL

## 2018-12-19 DIAGNOSIS — R41.82 ALTERED MENTAL STATUS, UNSPECIFIED ALTERED MENTAL STATUS TYPE: ICD-10-CM

## 2018-12-19 DIAGNOSIS — F41.9 ACUTE ANXIETY: ICD-10-CM

## 2018-12-19 DIAGNOSIS — F43.10 POSTTRAUMATIC STRESS DISORDER: ICD-10-CM

## 2018-12-19 LAB
BASOPHILS # BLD AUTO: 1.2 % (ref 0–1.8)
BASOPHILS # BLD: 0.09 K/UL (ref 0–0.12)
EOSINOPHIL # BLD AUTO: 0.15 K/UL (ref 0–0.51)
EOSINOPHIL NFR BLD: 2.1 % (ref 0–6.9)
ERYTHROCYTE [DISTWIDTH] IN BLOOD BY AUTOMATED COUNT: 42 FL (ref 35.9–50)
HCT VFR BLD AUTO: 40.6 % (ref 37–47)
HGB BLD-MCNC: 12.4 G/DL (ref 12–16)
IMM GRANULOCYTES # BLD AUTO: 0.01 K/UL (ref 0–0.11)
IMM GRANULOCYTES NFR BLD AUTO: 0.1 % (ref 0–0.9)
LYMPHOCYTES # BLD AUTO: 1.4 K/UL (ref 1–4.8)
LYMPHOCYTES NFR BLD: 19.3 % (ref 22–41)
MCH RBC QN AUTO: 25.8 PG (ref 27–33)
MCHC RBC AUTO-ENTMCNC: 30.5 G/DL (ref 33.6–35)
MCV RBC AUTO: 84.4 FL (ref 81.4–97.8)
MONOCYTES # BLD AUTO: 0.55 K/UL (ref 0–0.85)
MONOCYTES NFR BLD AUTO: 7.6 % (ref 0–13.4)
NEUTROPHILS # BLD AUTO: 5.04 K/UL (ref 2–7.15)
NEUTROPHILS NFR BLD: 69.7 % (ref 44–72)
NRBC # BLD AUTO: 0 K/UL
NRBC BLD-RTO: 0 /100 WBC
PLATELET # BLD AUTO: 279 K/UL (ref 164–446)
PMV BLD AUTO: 11.4 FL (ref 9–12.9)
RBC # BLD AUTO: 4.81 M/UL (ref 4.2–5.4)
WBC # BLD AUTO: 7.2 K/UL (ref 4.8–10.8)

## 2018-12-19 PROCEDURE — 84160 ASSAY OF PROTEIN ANY SOURCE: CPT

## 2018-12-19 PROCEDURE — 84165 PROTEIN E-PHORESIS SERUM: CPT

## 2018-12-19 PROCEDURE — 84443 ASSAY THYROID STIM HORMONE: CPT

## 2018-12-19 PROCEDURE — 85025 COMPLETE CBC W/AUTO DIFF WBC: CPT

## 2018-12-19 PROCEDURE — 80053 COMPREHEN METABOLIC PANEL: CPT

## 2018-12-19 PROCEDURE — 36415 COLL VENOUS BLD VENIPUNCTURE: CPT

## 2018-12-19 PROCEDURE — 86038 ANTINUCLEAR ANTIBODIES: CPT

## 2018-12-19 NOTE — BH THERAPY
[facility]  INITIAL ASSESSMENT    Name: Shama Paula  MRN: [id number]  : 1965  Age: 53 y.o.  Date of assessment: 2018  PCP: SANCHEZ Abel  Persons in attendance: Patient  Total session time: 60 minutes      CHIEF COMPLAINT AND HISTORY OF PRESENTING PROBLEM:  (as stated by Patient and Spouse/Partner):  Shama Paula is a 53 y.o., white female referred for assessment by Monse Villalpando A.P.R.N..  Primary presenting issue includes paranoia, anxiety and depression.   Chief Complaint   Patient presents with   • Anxiety     panic attacks   • Paranoid   • Depression     Bereavement       FAMILY/SOCIAL HISTORY  Current living situation/household members: Lives with  and 2 sons ages 14 & 16.  Relevant family history/structure/dynamics: Good  Current family/social stressors: Son age 14 has exzcema untreatable and is in pain and pt gets the brunt of his discomfort.   Quality/quantity of current family and/or social support: , mother in law.  Does patient/parent report a family history of behavioral health issues, diagnoses, or treatment? Yes  Family History   Problem Relation Age of Onset   • Diabetes Mother    • Arrythmia Mother         pacemaker   • Thyroid Father    • Arthritis Sister         RA        BEHAVIORAL HEALTH TREATMENT HISTORY  Does patient/parent report a history of prior behavioral health treatment for patient? Yes:    Dates Level of Care Facilty/Provider Diagnosis/Problem Medications    [facility] Post partum depression 6 months  Took Prozac for 4 months, gave her memory loss   12/3/18 ED [facility] Panic attack Released same day.                                                                 History of untreated behavioral health issues identified? No    MEDICAL HISTORY  Primary care behavioral health screenings: @PHQ@   Past medical/surgical history:   Past Medical History:   Diagnosis Date   • Anemia 2009   • Anxiety    • Atrophic vaginitis  8/25/2015    [facility].    • Celiac disease    • Celiac sprue 5/12/2009   • Colon polyp 5/12/2009   • Depression    • Elevated TSH 12/8/2010   • ENDOMETRIOSIS 5/12/2009   • Heart palpitations     COMES ON WITH STRESS   • Ovarian cyst 5/12/2009   • Pancreatic cyst 6/17/2009   • Pancreatitis      RECENT HOSP STAY  DC ON 5/4   • Preventative health care 5/12/2009   • Recurrent UTI 5/12/2009   • Tubal ligation       Past Surgical History:   Procedure Laterality Date   • COLECTOMY  2014    [facility]   • JESSIE BY LAPAROSCOPY  5/27/2009    Performed by JUSTINE MURPHY at SURGERY SAME DAY [facility] ORS   • GASTROSCOPY WITH BIOPSY  5/11/2009    Performed by HARINDER VICTOR at SURGERY [facility] ORS   • EGD WITH ASP/BX  5/11/2009    Performed by HARINDER VICTOR at SURGERY [facility] ORS   • CHOLECYSTECTOMY  2009   • PB ENLARGE BREAST WITH IMPLANT  1989   • GYN SURGERY      2 x c section   • OTHER      tonsillectomy   • PRIMARY C SECTION     • TUBAL LIGATION          Medication Allergies:  Dulcolax; Gluten meal; and Nkda [no known drug allergy]   Medical history provided by patient during current evaluation: no    Patient reports last physical exam: 2 years ago  Does patient/parent report any history of or current developmental concerns? No  Does patient/parent report nutritional concerns? Yes, loss of appetite  Does patient/parent report change in appetite or weight loss/gain? Yes, lost 5 lbs.  Does patient/parent report history of eating disorder symptoms? No  Does patient/parent report dental problem? No  Does patient/parent report physical pain? No   Indicate if pain is acute or chronic, and location:    Pain scale rating: [unfilled]   Does patient/parent report functional impact of medical, developmental, or pain issues?   no    EDUCATIONAL/LEARNING HISTORY  Is patient currently enrolled in a school/educational program?   No:   Highest grade level completed: Some college, 2 years comm. college  School  performance/functioning: Average  History of Special Education/repeated grades/learning issues: no  Preferred learning style: Doing  Current learning needs (large print, language barrier, etc):  none    EMPLOYMENT/RESOURCES  Is the patient currently employed? 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 Yes  Does patient identify impact of presenting issue on work functioning? No  Work or income-related stressors:  Second job was stressful due to the people coming and going.      HISTORY:  Does patient report current or past enlistment? No    [If yes, complete below items]  Does patient report history of exposure to combat? No  Does patient report history of  sexual trauma? No  Does patient report other -related stressors? No    SPIRITUAL/CULTURAL/IDENTITY:  What are the patient’s/family’s spiritual beliefs or practices? Gnosticism  What is the patient’s cultural or ethnic background/identity?   How does the patient identify their sexual orientation? heterosexual  How does the patient identify their gender? female  Does the patient identify any spiritual/cultural/identity factors as relevant to the presenting issue? No    LEGAL HISTORY  Has the patient ever been involved with juvenile, adult, or family legal systems? No   [If yes, trigger section below:]  Does patient report ever being a victim of a crime?  No  Does patient report involvement in any current legal issues?  No  Does patient report ever being arrested or committing a crime? No  Does patient report any current agency (parole/probation/CPS/) involvement? No    ABUSE/NEGLECT/TRAUMA SCREENING  Does patient report feeling “unsafe” in his/her home, or afraid of anyone? No  Does patient report any history of physical, sexual, or emotional abuse? No  Does parent or significant other report any of the above? No  Is there evidence of neglect by self?  No  Is there evidence of neglect by a caregiver? No  Does the patient/parent report any history of CPS/APS/police involvement related to suspected abuse/neglect or domestic violence? No  Does the patient/parent report any other history of potentially traumatic life events? No  Based on the information provided during the current assessment, is a mandated report of suspected abuse/neglect being made?  No     SAFETY ASSESSMENT - SELF  Does patient acknowledge current or past symptoms of dangerousness to self? No  Does parent/significant other report patient has current or past symptoms of dangerousness to self? NA      Recent change in frequency/specificity/intensity of suicidal thoughts or self-harm behavior? No  Current access to firearms, medications, or other identified means of suicide/self-harm? Yes  If yes, willing to restrict access to means of suicide/self-harm? Yes  Protective factors present: Future-oriented, Hopefulness, Optimism and Strong family connections    Current Suicide Risk: Not applicable  Crisis Safety Plan completed and copy given to patient: No    SAFETY ASSESSMENT - OTHERS  Does paor past symptoms of aggressive behavior or risk to others? No  Does parent/significant othtient acknowledge current or past symptoms of aggressive behavior or risk to others? Yes,at work  Does parent/significant other report patient has current or past symptoms of aggressive behavior or risk to others? No    Recent change in frequency/specificity/intensity of thoughts or threats to harm others? No  Current access to firearms/other identified means of harm? Yes  If yes, willing to restrict access to weapons/means of harm? Yes  Protective factors present: Stable relationships    Current Homicide Risk:  Not applicable  Crisis Safety Plan completed and copy given to patient? No  Based on information provided during the current assessment, is a mandated “duty to warn” being exercised? No    SUBSTANCE USE/ADDICTION  "HISTORY  [] Not applicable - patient 10 years of age or younger    Is there a family history of substance use/addiction? No  Does patient acknowledge or parent/significant other report use of/dependence on substances? No  Last time patient used alcohol: 2 weeks ago  Within the past week? No  Last time patient used marijuana: None  Within the past month? No  Any other street drugs ever tried even once? No  Any use of prescription medications/pills without a prescription, or for reasons others than originally prescribed?  No  Any other addictive behavior reported (gambling, shopping, sex)? No     Drug History:  Amphetamine:  Amphetamine frequency: Never used      Cannibis:  Cannabis frequency: Never used      Cocaine:  Cocaine frequency: Never used      Ecstasy:  Ecstasy frequency: Never used      Hallucinogen:  Hallucinogen frequency: Never used      Inhalant:   Inhalant frequency: Never used      Opiate:  Opiate frequency: Never used  Cannabis frequency: Never used      Other:  Other drug frequency: Never used      Sedative:   Sedative frequency: Never used          What consequences does the patient associate with any of the above substance use and or addictive behaviors? None    Patient’s motivation/readiness for change: \"I'm ready to change and discuss my paranoia less. \"    [] Patient denies use of any substance/addictive behaviors    STRENGTHS/ASSETS  Strengths Identified by interviewer: Family suppport and Stable relationships  Strengths Identified by patient: Kerr and empathy.    MENTAL STATUS/OBSERVATIONS   Participation: Active verbal participation, Limited verbal participation, Attentive and Engaged  Grooming: Casual and Neat  Orientation:Alert and Fully Oriented   Behavior: Calm  Eye contact: Good   Mood:Depressed and Anxious  Affect:Constricted  Thought process: Logical and Goal-directed  Thought content:  Paranoia  Speech: Rate within normal limits and Volume within normal limits  Perception: " Within normal limits  Memory: Recent:  Good  Insight: Adequate  Judgment:  Adequate  Other:    Family/couple interaction observations:  was supportive.    RESULTS OF SCREENING MEASURES:  [] Not applicable  Measure:   Score:     Measure:   Score:       CLINICAL FORMULATION: Pt is a 53 year old white female who was referred by her PCP for paranoia, anxiety and a first panic attack on 12/3/18 that took her to the ED where she refused any medication to help calm her down. She saw her PCP, Monse Villalpando, yesterday.  Refer to her report for additional initial presenting information.  Pt was able to clearly identify a precipitating event at her job at [employer]  that occurred on 10/24/18 where she witnessed and feared for her bosses life and her own due to threats that were coming to her boss. There are many details leading up to this episode of extreme fear that continues to carry over 2 months later. Since this event the pt is experiencing hypervigilance, constant fear and anxiety, increased sleep disturbance, increased startle response, paranoia that people are following her from work and that they will hurt her, her family or her friends if she is out with them, distorted cognitions linking events together as she tries to make sense of her fear response, problems concentrating, not feeling any happiness or ynes, and avoidance of this place of work as she did not return to work. Since this event on 10/24/18 the pt has become agoraphobic and experienced a major panic attack.   stated she was not at all like this before 10/24/18 and that she was outgoing and actively involved in things going on especially at her children's school.  Additionally, there may be some underlying bereavement as her father passed away in February unexpectedly. She is supported by her  and mother-in law.  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  She denied S/H ideations and A/V  hallucinations.  She was oriented x 4. Pt stated that she would be open to medication for her anxiety.       DIAGNOSTIC IMPRESSION(S):  PTSD      IDENTIFIED NEEDS/PLAN:  [If any of these marked, trigger DISPOSITION list]  Mood/anxiety and Maladaptive behavior  Refer to [facility]: [facility]    Does patient express agreement with the above plan? Yes     Referral appointment(s) scheduled? Yes. Pt sees Dr. MI on 12/24/18 at 1:00pm.  Start PHP on 12/26/18 at 9:00am.         NAY Tierney.

## 2018-12-20 LAB
ALBUMIN SERPL BCP-MCNC: 4.6 G/DL (ref 3.2–4.9)
ALBUMIN/GLOB SERPL: 1.6 G/DL
ALP SERPL-CCNC: 73 U/L (ref 30–99)
ALT SERPL-CCNC: 9 U/L (ref 2–50)
ANION GAP SERPL CALC-SCNC: 9 MMOL/L (ref 0–11.9)
AST SERPL-CCNC: 15 U/L (ref 12–45)
BILIRUB SERPL-MCNC: 0.3 MG/DL (ref 0.1–1.5)
BUN SERPL-MCNC: 16 MG/DL (ref 8–22)
CALCIUM SERPL-MCNC: 9.4 MG/DL (ref 8.5–10.5)
CHLORIDE SERPL-SCNC: 107 MMOL/L (ref 96–112)
CO2 SERPL-SCNC: 26 MMOL/L (ref 20–33)
CREAT SERPL-MCNC: 0.87 MG/DL (ref 0.5–1.4)
FASTING STATUS PATIENT QL REPORTED: NORMAL
GLOBULIN SER CALC-MCNC: 2.8 G/DL (ref 1.9–3.5)
GLUCOSE SERPL-MCNC: 102 MG/DL (ref 65–99)
POTASSIUM SERPL-SCNC: 3.6 MMOL/L (ref 3.6–5.5)
PROT SERPL-MCNC: 7.4 G/DL (ref 6–8.2)
SODIUM SERPL-SCNC: 142 MMOL/L (ref 135–145)
TSH SERPL DL<=0.005 MIU/L-ACNC: 3.77 UIU/ML (ref 0.38–5.33)

## 2018-12-21 LAB — NUCLEAR IGG SER QL IA: NORMAL

## 2018-12-22 LAB
ALBUMIN SERPL-MCNC: 3.94 G/DL (ref 3.75–5.01)
ALPHA1 GLOB SERPL ELPH-MCNC: 0.31 G/DL (ref 0.19–0.46)
ALPHA2 GLOB SERPL ELPH-MCNC: 0.75 G/DL (ref 0.48–1.05)
B-GLOBULIN SERPL ELPH-MCNC: 0.96 G/DL (ref 0.48–1.1)
EER PROT ELECT SER Q1092: NORMAL
GAMMA GLOB SERPL ELPH-MCNC: 1.04 G/DL (ref 0.62–1.51)
INTERPRETATION SERPL IFE-IMP: NORMAL
PROT SERPL-MCNC: 7 G/DL (ref 6–8.3)

## 2018-12-24 ENCOUNTER — OFFICE VISIT (OUTPATIENT)
Dept: BEHAVIORAL HEALTH | Facility: CLINIC | Age: 53
End: 2018-12-24
Payer: COMMERCIAL

## 2018-12-24 VITALS
HEART RATE: 62 BPM | SYSTOLIC BLOOD PRESSURE: 121 MMHG | HEIGHT: 68 IN | BODY MASS INDEX: 30.31 KG/M2 | DIASTOLIC BLOOD PRESSURE: 80 MMHG | WEIGHT: 200 LBS

## 2018-12-24 DIAGNOSIS — F41.9 ANXIETY: ICD-10-CM

## 2018-12-24 DIAGNOSIS — F43.10 PTSD (POST-TRAUMATIC STRESS DISORDER): ICD-10-CM

## 2018-12-24 DIAGNOSIS — G47.00 INSOMNIA, UNSPECIFIED TYPE: ICD-10-CM

## 2018-12-24 PROCEDURE — 99205 OFFICE O/P NEW HI 60 MIN: CPT | Performed by: PSYCHIATRY & NEUROLOGY

## 2018-12-24 RX ORDER — CLONAZEPAM 0.5 MG/1
TABLET ORAL
Qty: 20 TAB | Refills: 0 | Status: SHIPPED | OUTPATIENT
Start: 2018-12-24 | End: 2019-01-23

## 2018-12-24 ASSESSMENT — PATIENT HEALTH QUESTIONNAIRE - PHQ9
SUM OF ALL RESPONSES TO PHQ QUESTIONS 1-9: 8
5. POOR APPETITE OR OVEREATING: 1 - SEVERAL DAYS
CLINICAL INTERPRETATION OF PHQ2 SCORE: 2

## 2018-12-25 NOTE — BH THERAPY
BEHAVIORAL HEALTH  INITIAL PSYCHIATRIC EVALUATION    Name: Shama Paula  MRN: [id number]  : 1965  Age: 53 y.o.  Date of assessment: 2018  PCP: SANCHEZ Abel  Persons in attendance:Patient and Spouse/Partner  Total face-to-face time: 60 minutes    CHIEF COMPLAINT AND HISTORY OF PRESENTING PROBLEM:   (As stated by Patient and Spouse/Partner)  Shama Paula is a 53 y.o., White female referred for assessment by No ref. provider found. Primary presenting issue includes   Chief Complaint   Patient presents with   • Initial  Evaluation     The patient presented today with severe anxiety, paranoia, and insomnia.   .    HISTORY OF PRESENT ILLNESS:    This is a 54 yo female, , employed, mother of two children, she is holding two part time jobs, seen today for evaluation.   The patient reported increased stress from her job for the last one year. The patient had trouble trusting co workers and they were following her and watching her. Patient reported that she is afraid to leave her home. The patient is getting panic attacks and she was evaluated in the ER. The patient is afraid to take medication. The patient does not want to leave her medical record open to medical doctors. The patients  notices that she is sleeping only few hours at night. The patient lost few ponds because she is not eating well.The patient reported that since october she is not sleeping well. The patient is afraid of every thing and feels she is watched and monitored all the time. The patient is getting agitated easily and she gets road rage. The patient has history of post partum depression 20 years ago and she tried prozac for short time. The patient lost her dad in February and she felt that she is watched and followed since then.  The patient never seen a psychiatrist before. The patient denied psychiatric inpatient treatment.  The patient denied suicidal thoughts and attempts.  The patient denied symptoms of  kathryn, hypomania, hallucination, and denied suicidal ideations.           FAMILY/SOCIAL HISTORY:  Does patient/parent report a family history of behavioral health issues, diagnoses, or treatment? Yes   The patient has two sisters and a brother. The patients one sister has alcohol problems. The patient moved to [place] from [place] when she was 24 years old. The patient is  to her  20 years and they have two children. The patient has been working all the time.      Family History   Problem Relation Age of Onset   • Diabetes Mother    • Arrythmia Mother         pacemaker   • Thyroid Father    • Arthritis Sister         RA       Current living situation/household members: live with her  and two children.  Relevant family history/structure/dynamics: hol;ding two part time job.  Quality/quantity of current family and/or social support: good support.    Social History     Social History   • Marital status:      Spouse name: N/A   • Number of children: N/A   • Years of education: N/A     Occupational History   • Not on file.     Social History Main Topics   • Smoking status: Former Smoker     Years: 5.00   • Smokeless tobacco: Never Used      Comment: in her 20's   • Alcohol use 1.2 oz/week     2 Glasses of wine per week      Comment: social drinker    • Drug use: No   • Sexual activity: Yes     Partners: Male      Comment: tubal ligation     Other Topics Concern   • Not on file     Social History Narrative    2017: works part time  for        PSYCHIATRIC TREATMENT HISTORY:  Does patient/parent report a history of outpatient psychiatric treatment for patient?   No:     Does patient/parent report a history of psychiatric hospitalizations for patient?  No:    Does patient/parent report a history of psychotherapy or other behavioral health treatment for patient?   No:    PAST MEDICAL HISTORY:         Past Medical History:   Diagnosis Date   • Anemia 5/12/2009   • Anxiety    • Atrophic  vaginitis 2015    Seegladis Garza.    • Celiac disease    • Celiac sprue 2009   • Colon polyp 2009   • Depression    • Elevated TSH 2010   • ENDOMETRIOSIS 2009   • Heart palpitations     COMES ON WITH STRESS   • Ovarian cyst 2009   • Pancreatic cyst 2009   • Pancreatitis      RECENT HOSP STAY  DC ON    • Preventative health care 2009   • Recurrent UTI 2009   • Tubal ligation        Medication Allergies  Dulcolax; Gluten meal; and Nkda [no known drug allergy]    Medications (non psychiatric)  Current Outpatient Prescriptions   Medication Sig Dispense Refill   • clonazePAM (KLONOPIN) 0.5 MG Tab Take one tablet by mouth at bed time as needed for 30 days. 20 Tab 0     No current facility-administered medications for this visit.        DEVELOPMENTAL HISTORY:  Delivery:Full term, normal vaginal delivery  Birth weight: not asked.  Prenatal complications:  No   complications:  No   complications:  No  In utero substance exposure:  No    Reached developmental milestones within normal limits?   Gross motor:  Yes  Fine motor:  Yes   Speech:  Yes   Social interaction:  Yes    EDUCATIONAL:  Is patient currently enrolled in a school/educational program?   No:   Highest grade level completed:  Two years of college.  School performance/functioning: good.    Psychiatric Review of Systems:   Mood: Restricted range of emotional experience   Anxiety: Frequent worry, Social anxiety, Panic symptoms/attacks, Hypervigilance and Situational anxiety  Sleep: Initial insomnia, Middle insomnia, Terminal insomnia and Restless sleep (tossing and turning)  Psychomotor/Energy: Psychomotor agitation and Increased impulsivity  Eating/Appetite: Decreased appetite  Cognitive: Difficulty maintaining focus - acute or situational  Behavior: Limited range of behaviors   Psychosis: Paranoia  Social: Frequent conflicts with peers and Avoidance of social interactions  Sensory: Other: denied.        Other symptoms: paranoia.    LEGAL HISTORY:  Has the patient ever been involved with juvenile, adult, or family legal systems? No    Does patient report ever committing a crime? No   Does patient report every being arrested? No  Does patient report ever being a victim of a crime? No  Does patient report involvement in any current legal issues?No  Does patient report any current agency (parole/probation/CPS/) involvement? No    ABUSE/NEGLECT SCREENING:  Does patient report feeling “unsafe” in his/her home, or afraid of anyone? No  Does patient report any history of physical, sexual, or emotional abuse? No  Does parent or significant other report any of the above? No  Is there evidence of neglect by self?No  Is there evidence of neglect by a caregiver? No  Does the patient/parent report any history of CPS/APS/police involvement related to suspected abuse/neglect or domestic violence? No    Based on the information provided during the current assessment, is a mandated report of suspected abuse/neglect being made?   No    SAFETY ASSESSMENT - SELF:  Does patient acknowledge current or past symptoms of dangerousness to self? No    Does parent/significant other report patient has current or past symptoms of dangerousness to self? No      History of suicide by family member: No  History of suicide by friend/significant other: No    Recent change in amount/specificity/intensity of suicidal thoughts or self-harm behavior?No  Current access to firearms, medications, or other identified means of suicide/self-harm? No  If yes, willing to restrict access to means of suicide/self-harm? No  Protective factors present: Fear of suicide    Current Suicide Risk: Low  Safety Plan completed, documented in chart, and copy given to patient: No     Crisis Safety Plan completed and copy given to patient: No     SAFETY ASSESSMENT - OTHERS:  Does patient acknowledge current or past symptoms of aggressive behavior or risk to  "others? No  Does parent/significant other report patient has current or past symptoms of aggressive behavior or risk to others? No      Recent change in amount/specificity/intensity of thoughts or threats to harm others? No  Current access to firearms/other identified means of harm? No  If yes, willing to restrict access to weapons/means of harm? No    Current Homicide Risk: Low  Crisis safety Plan completed, documented in chart, and copy given to patient? No    Based on information provided during the current assessment, is a mandated \"duty to warn\" being exercised? No    SUBSTANCE USE/ADDICTION HISTORY:  [] Not applicable - patient 10 years of age or younger    Is there a family history of substance use/addiction? No  Does patient acknowledge or parent/significant other report use of/dependence on substances? No  Last time patient used alcohol: denied.  Within the past week? No  Last time patient used marijuana: denied.  Within the past month? No  Any other street drugs ever tried even once? No  Any use of prescription medications/pills without a prescription, or for reasons others than originally prescribed?  No  Any other addictive behavior reported (gambling, shopping, sex)? No    Drug History:  Amphetamine:  Amphetamine frequency: Never used      Cannibis:  Cannabis frequency: Never used      Cocaine:  Cocaine frequency: Never used      Ecstasy:  Ecstasy frequency: Never used      Hallucinogen:  Hallucinogen frequency: Never used      Inhalant:   Inhalant frequency: Never used      Opiate:  Opiate frequency: Never used  Cannabis frequency: Never used      Other:  Other drug frequency: Never used      Sedative:   Sedative frequency: Never used        What consequences does the patient associate with any of the above substance use and or addictive behaviors?   None    Patient’s motivation/readiness for change: yes.    [] Patient denies use of any substance/addictive behaviors    STRENGTHS/ASSETS:  Strengths " "Identified by interviewer: Evidence of good judgement, Family suppport, Social support and Stable relationships  Strengths Identified by patient: willing to get treatment.    MENTAL STATUS EXAM/OBSERVATIONS  /80 (BP Location: Right arm, Patient Position: Sitting)   Pulse 62   Ht 1.727 m (5' 8\")   Wt 90.7 kg (200 lb)   LMP 12/06/2012   BMI 30.41 kg/m²   Participation: Active verbal participation, Attentive and Engaged  Grooming:  Neat  Orientation: Alert and Fully Oriented   Eye contact:  Good  Behavior: Calm   Mood:  Anxious  Affect: Flexible and Full range  Thought process: Logical and Goal-directed  Thought content: Preoccupation, Rumination and Obsessions  Speech: Rate within normal limits and Volume within normal limits  Perception: paranoid.  Memory:  No gross evidence of memory deficits  Insight:  Adequate  Judgment: Adequate  Other:   Family/couple interaction observations: good.    RESULTS OF SCREENING MEASURES:  [] Not applicable  Measure:   Score:     Measure:   Score:        CLINICAL FORMULATION:   This is a 54 yo female, employed, working part time,  mother of two children, lives with her  and children, seen today for evaluation. The patient denied family history of mental disorder. The patient was born and raised in [place] and moved [place] she was 24 years old. The patient a history of post partum depression twenty years ago and took prozac for few months. The patient lost her dad in February and she developed anxiety, and insomnia. The patient developed paranoia at work and she felt they were following her and watching her. The patient developed panic attacks and he was treated in ER. The patient is not sleeping well at night and she complained of fear going out the house. The patient denied symptoms of depression but she is afraid and not sleeping well.         DIAGNOSTIC IMPRESSION(S):    1. PTSD (post-traumatic stress disorder)    2. Anxiety    3. Insomnia, unspecified " type         IDENTIFIED NEEDS/PLAN:  [If any of these marked, trigger DISPOSITION list]  Mood/anxiety and Other: 1. PTSD. 2. r/o delusinal disorder persecutory type.  Refer to [facility]: Partial Hospital Program and start the program on wednesday. and The patient refuses to try any antipsychotic because of her labeling. The patient agreed to try clonazepam 0.5 mg one at bed time for anxiety and insomnia.     Does patient express agreement with the above plan? Yes    Referral appointment(s) scheduled? Yes    [x] More than 50% of face-to-face time was spent in counseling and coordinating care  Discussed:   The patient does want other doctors to read her assessment and she wants the record keep as sensitive.   The patient agreed to come to partial program and she is afraid to talk in the group.  The patient agreed to take clonazepam 0.5 mg at night # 20 NR.  We discussed the risk benefit, alternative and the patient signed controlled medication treatment agreement today.  The patient is educated about risperidone and other antipsychotic and she agreed to think about and may try it later.  The patient agreed to start the program on Wednesday.  Follow up in PHP.  Ashwin Voss M.D.

## 2018-12-26 ENCOUNTER — HOSPITAL ENCOUNTER (OUTPATIENT)
Dept: BEHAVIORAL HEALTH | Facility: MEDICAL CENTER | Age: 53
End: 2018-12-26
Attending: PSYCHIATRY & NEUROLOGY
Payer: COMMERCIAL

## 2018-12-26 DIAGNOSIS — F43.10 POSTTRAUMATIC STRESS DISORDER: ICD-10-CM

## 2018-12-26 PROCEDURE — G0176 OPPS/PHP;ACTIVITY THERAPY: HCPCS

## 2018-12-26 PROCEDURE — G0177 OPPS/PHP; TRAIN & EDUC SERV: HCPCS

## 2018-12-26 PROCEDURE — G0410 GRP PSYCH PARTIAL HOSP 45-50: HCPCS

## 2018-12-26 NOTE — BH THERAPY
Group Therapy Checklist  Attendance: Attended  Attendance Duration (min):  (60)  Number of Participants: 11  Program/Group: Partial Hospital Program  Topics Covered: Cognitive distortions  Participation: Active verbal participation  Affect/Mood Range: Normal range, Blunted  Affect/Mood Display: Congruent w/content  Cognition: Alert, Oriented  Evidence of Imminent Suicide Risk: No  Evidence of imminent homicide risk: No  Therapeutic Interventions: Cognitive clarification, Behavioral activation  Progress Toward Treatment Goal: Mild improvement

## 2018-12-26 NOTE — BH THERAPY
Group Therapy Checklist  Attendance: Attended  Attendance Duration (min):  (60)  Number of Participants: 5  Program/Group: Partial Hospital Program  Topics Covered: Pain vs. Suffering  Participation: Active verbal participation  Affect/Mood Range: Normal range, Flexible  Affect/Mood Display: Congruent w/content  Cognition: Alert, Oriented  Evidence of Imminent Suicide Risk: No  Evidence of imminent homicide risk: No  Therapeutic Interventions: Cognitive clarification, Emotion clarification  Progress Toward Treatment Goal: Moderate improvement

## 2018-12-26 NOTE — BH THERAPY
Group Therapy Checklist  Attendance: Attended  Attendance Duration (min):  (60)  Number of Participants: 5  Program/Group: Partial Hospital Program  Topics Covered:  (creative art)  Participation: Active verbal participation  Affect/Mood Range: Normal range, Flexible  Affect/Mood Display: Congruent w/content  Cognition: Alert, Oriented  Evidence of Imminent Suicide Risk: No  Evidence of imminent homicide risk: No  Therapeutic Interventions: Socialization  Progress Toward Treatment Goal: Moderate improvement

## 2018-12-26 NOTE — BH THERAPY
"Group Therapy Checklist  Attendance: Attended  Attendance Duration (min):  (90 minutes)  Number of Participants: 11  Program/Group: Partial Hospital Program  Topics Covered:  (group therapy)  Participation: Active verbal participation, Attentive  Affect/Mood Range: Labile  Affect/Mood Display: Congruent w/content, Tearful  Cognition: Alert, Oriented  Evidence of Imminent Suicide Risk: No  Evidence of imminent homicide risk: No  Therapeutic Interventions: Emotion clarification  Progress Toward Treatment Goal: Mild improvement.  She shared that she has been anxious for the last 4 months and that she has been afraid to leave the house for the last 2 months.  She was able to leave the house yesterday to have a [place] celebration at her 's grandfather's house and \"put on a happy face\", and was able to come to group today even though she was anxious.  "

## 2018-12-27 ENCOUNTER — HOSPITAL ENCOUNTER (OUTPATIENT)
Dept: BEHAVIORAL HEALTH | Facility: MEDICAL CENTER | Age: 53
End: 2018-12-27
Attending: PSYCHIATRY & NEUROLOGY
Payer: COMMERCIAL

## 2018-12-27 ENCOUNTER — OFFICE VISIT (OUTPATIENT)
Dept: MEDICAL GROUP | Facility: MEDICAL CENTER | Age: 53
End: 2018-12-27
Payer: COMMERCIAL

## 2018-12-27 VITALS
SYSTOLIC BLOOD PRESSURE: 130 MMHG | TEMPERATURE: 97.5 F | OXYGEN SATURATION: 94 % | DIASTOLIC BLOOD PRESSURE: 72 MMHG | BODY MASS INDEX: 30.31 KG/M2 | HEIGHT: 68 IN | WEIGHT: 200 LBS | RESPIRATION RATE: 16 BRPM | HEART RATE: 104 BPM

## 2018-12-27 DIAGNOSIS — Z00.00 ANNUAL PHYSICAL EXAM: ICD-10-CM

## 2018-12-27 DIAGNOSIS — F99 PSYCHIATRIC PROBLEM: ICD-10-CM

## 2018-12-27 DIAGNOSIS — F43.10 POSTTRAUMATIC STRESS DISORDER: ICD-10-CM

## 2018-12-27 DIAGNOSIS — Z23 NEED FOR VACCINATION: ICD-10-CM

## 2018-12-27 DIAGNOSIS — Z12.31 ENCOUNTER FOR SCREENING MAMMOGRAM FOR BREAST CANCER: ICD-10-CM

## 2018-12-27 DIAGNOSIS — K90.0 CELIAC SPRUE: ICD-10-CM

## 2018-12-27 PROBLEM — Z79.890 POSTMENOPAUSAL HRT (HORMONE REPLACEMENT THERAPY): Status: RESOLVED | Noted: 2017-05-09 | Resolved: 2018-12-27

## 2018-12-27 PROCEDURE — G0176 OPPS/PHP;ACTIVITY THERAPY: HCPCS

## 2018-12-27 PROCEDURE — G0410 GRP PSYCH PARTIAL HOSP 45-50: HCPCS

## 2018-12-27 PROCEDURE — G0177 OPPS/PHP; TRAIN & EDUC SERV: HCPCS

## 2018-12-27 PROCEDURE — 99396 PREV VISIT EST AGE 40-64: CPT | Performed by: NURSE PRACTITIONER

## 2018-12-27 NOTE — BH THERAPY
Group Therapy Checklist  Attendance: Attended  Attendance Duration (min):  (60 min.)  Number of Participants: 4  Program/Group: Partial Hospital Program  Topics Covered: Regulating emotions  Participation: Limited verbal participation, Attentive  Affect/Mood Range: Constricted  Affect/Mood Display: Congruent w/content  Cognition: Oriented, Alert  Evidence of Imminent Suicide Risk: No  Evidence of imminent homicide risk: No  Therapeutic Interventions: Psychoeducation re: (Comment), Emotion clarification  Progress Toward Treatment Goal: Moderate improvement

## 2018-12-27 NOTE — BH THERAPY
Group Therapy Checklist  Attendance: Attended  Attendance Duration (min):  (60 min.)  Number of Participants: 9  Program/Group: Partial Hospital Program  Topics Covered: Spirituality  Participation: Limited verbal participation, Attentive  Affect/Mood Range: Constricted  Affect/Mood Display: Congruent w/content  Cognition: Oriented, Alert  Evidence of Imminent Suicide Risk: No  Evidence of imminent homicide risk: No  Therapeutic Interventions: Psychoeducation re: (Comment), Emotion clarification  Progress Toward Treatment Goal: Moderate improvement

## 2018-12-28 ENCOUNTER — HOSPITAL ENCOUNTER (OUTPATIENT)
Dept: BEHAVIORAL HEALTH | Facility: MEDICAL CENTER | Age: 53
End: 2018-12-28
Attending: PSYCHIATRY & NEUROLOGY
Payer: COMMERCIAL

## 2018-12-28 DIAGNOSIS — F22 PARANOIA (HCC): ICD-10-CM

## 2018-12-28 PROCEDURE — G0176 OPPS/PHP;ACTIVITY THERAPY: HCPCS

## 2018-12-28 PROCEDURE — G0177 OPPS/PHP; TRAIN & EDUC SERV: HCPCS

## 2018-12-28 PROCEDURE — G0410 GRP PSYCH PARTIAL HOSP 45-50: HCPCS

## 2018-12-28 NOTE — BH THERAPY
Group Therapy Checklist  Attendance: Attended  Attendance Duration (min):  (60)  Number of Participants: 13  Program/Group: Partial Hospital Program  Topics Covered: ACT concept intro  Participation: Active verbal participation  Affect/Mood Range: Normal range, Flexible  Affect/Mood Display: Congruent w/content  Cognition: Alert, Oriented  Evidence of Imminent Suicide Risk: No  Evidence of imminent homicide risk: No  Therapeutic Interventions: Cognitive clarification, Values clarification, Mindfulness exercise  Progress Toward Treatment Goal: Moderate improvement

## 2018-12-28 NOTE — BH THERAPY
Group Therapy Checklist  Attendance: Attended  Attendance Duration (min):  (90)  Number of Participants: 9  Program/Group: Partial Hospital Program  Topics Covered:  (process group)  Participation: Active verbal participation, Attentive, Supportive to other group members, Open to feedback  Affect/Mood Range: Normal range, Flexible  Affect/Mood Display: Congruent w/content  Cognition: Alert, Oriented  Evidence of Imminent Suicide Risk: No  Evidence of imminent homicide risk: No  Therapeutic Interventions: Emotion clarification, Supportive psychotherapy  Progress Toward Treatment Goal: Moderate improvement  Patient actively participated in process group.  Addressed active unresolved emotional issues. Taught some emotional skills and techniques to assist with the emotional skill building that relates to their presenting issues and active treatment plan.

## 2018-12-28 NOTE — PROGRESS NOTES
Chief Complaint   Patient presents with   • Annual Exam     Shama Paula is a 53 y.o. female established patient here for physical exam as requested by her intensive outpatient therapy program.  She   Psychiatric problem  For detailed history please see office note dated 12/18/18.  Since last visit patient has started that intensive outpatient therapy program, 5 days weekly.  She has been evaluated by psychiatry, given trial of clonazepam.  She has not yet tried the medication.  She does find that the counseling has been helpful  No SI/HI, denies manic behavior    Celiac sprue  Following celiac diet, History of colon resection.  No recent abdominal pain, distention, no blood in stool    Current medicines (including changes today)  Current Outpatient Prescriptions   Medication Sig Dispense Refill   • clonazePAM (KLONOPIN) 0.5 MG Tab Take one tablet by mouth at bed time as needed for 30 days. 20 Tab 0     No current facility-administered medications for this visit.      She  has a past medical history of Anemia (5/12/2009); Anxiety; Atrophic vaginitis (8/25/2015); Celiac disease; Celiac sprue (5/12/2009); Colon polyp (5/12/2009); Depression; Elevated TSH (12/8/2010); ENDOMETRIOSIS (5/12/2009); Heart palpitations; Ovarian cyst (5/12/2009); Pancreatic cyst (6/17/2009); Pancreatitis; Preventative health care (5/12/2009); Recurrent UTI (5/12/2009); and Tubal ligation.  She  has a past surgical history that includes gastroscopy with biopsy (5/11/2009); egd with asp/bx (5/11/2009); tubal ligation; cholecystectomy (2009); gyn surgery; primary c section; other; reta by laparoscopy (5/27/2009); pr enlarge breast with implant (1989); and colectomy (2014).  Social History   Substance Use Topics   • Smoking status: Former Smoker     Years: 5.00   • Smokeless tobacco: Never Used      Comment: in her 20's   • Alcohol use 1.2 oz/week     2 Glasses of wine per week      Comment: social drinker      Social History     Social  "History Narrative    2017: works part time  for      Family History   Problem Relation Age of Onset   • Diabetes Mother    • Arrythmia Mother         pacemaker   • Thyroid Father    • Arthritis Sister         RA     Family Status   Relation Status   • Mo Alive   • Fa    • Sis Alive   • PAunt  at age 60        MS - onset about 40    • Son Alive   • Son Alive         ROS  Problems listed discussed above, all other systems reviewed and negative     Objective:     Blood pressure 130/72, pulse (!) 104, temperature 36.4 °C (97.5 °F), temperature source Temporal, resp. rate 16, height 1.727 m (5' 8\"), weight 90.7 kg (200 lb), last menstrual period 2012, SpO2 94 %, not currently breastfeeding. Body mass index is 30.41 kg/m².  Physical Exam:  General: Alert, oriented in no acute distress.  Eye contact is good, speech is normal, affect calm  HEENT:  EOMI, perrl, Oral mucosa pink moist, no lesions. Nares patent. TMs gray with good landmarks bilaterally. No cervical or supraclavicular lymphadenopathy.  Lungs: clear to auscultation bilaterally, good aeration, normal effort. No wheeze/ rhonchi/ rales.  CV: regular rate and rhythm, S1, S2. No murmur, no JVD, no edema.. Pedal pulses 2 + bilaterally  Abdomen: soft, nontender, BS x4, No CVAT, no hepatosplenomegaly.  Ext: color normal, vascularity normal, temperature normal. No rash or lesions.  MS: No joint swelling or redness. Strength is 5/5 globally  Neuro: DTR 2+ bilaterally  Assessment and Plan:   The following treatment plan was discussed   1. Annual physical exam  Normal physical exam. General health and wellness discussion including healthy diet, regular exercise. 2000 iu Vit d3 advised daily. Preventative health screenings as listed below. Recent labs reviewed. Advised regular dental cleanings, eye exam yearly.   2. Celiac sprue   continue celiac diet   3. Psychiatric problem   currently enrolled in outpatient therapy program   4. " Encounter for screening mammogram for breast cancer  MA-SCREEN MAMMO W/CAD-BILAT   5. Need for vaccination   we are, unfortunately, out of flu vaccine at this time.  She may return to the office at a later date or obtain this from her pharmacy       Followup: 3 months, sooner as needed           Please note that this dictation was created using voice recognition software. I have worked with consultants from the vendor as well as technical experts from [employer] [facility] [employer] to optimize the interface. I have made every reasonable attempt to correct obvious errors, but I expect that there are errors of grammar and possibly content that I did not discover before finalizing the note.

## 2018-12-28 NOTE — CARE PLAN
Problem: Mood Instability Interfering with Adl’S  Goal: Effective symptom management    Intervention: Individual Counseling Sessions   [facility]  Therapy Progress Note    Patient Name: Shama Paula  Patient MRN: [id number]  Today's Date: 12/27/2018     Type of session:Individual psychotherapy  Length of session: 30 minutes  Persons in attendance:Patient    Subjective/New Info: initial individual : develop treatment plan and establish rapport. Shama processed this past year of paranoia and the real vs the fear. She remains uncertain but less paranoid about the events of recent months. She was able to drive herself here today without incident. She denies recent panic attacks.     Objective/Observations:   Participation: Active verbal participation, Attentive, Engaged and Open to feedback   Grooming: Casual and Neat   Cognition: Alert and Fully Oriented   Eye contact: Limited   Mood: Depressed and Anxious   Affect: Flexible and Full range   Thought process: Logical and Goal-directed   Speech: Rate within normal limits and Volume within normal limits   Other:     Diagnoses:   1. Posttraumatic stress disorder         Current risk:   SUICIDE: Not applicable   Homicide: Not applicable   Self-harm: Not applicable   Relapse: Not applicable   Other:    Safety Plan reviewed? Not Indicated   If evidence of imminent risk is present, intervention/plan:     Therapeutic Intervention(s): Develop/modify treatment plan, Distress tolerance skills, Establish rapport, Stressors assessed and Supportive psychotherapy    Treatment Goal(s)/Objective(s) addressed: develop treatment plan and establish rapport     Progress toward Treatment Goals: Return to baseline    Plan:  - Continue Partial Hospital Program  - Next appointment scheduled:  12/28/2018  - Patient is in agreement with the above plan:  YES    Agnes Solorzano R.N.  12/27/2018

## 2018-12-28 NOTE — ASSESSMENT & PLAN NOTE
For detailed history please see office note dated 12/18/18.  Since last visit patient has started that intensive outpatient therapy program, 5 days weekly.  She has been evaluated by psychiatry, given trial of clonazepam.  She has not yet tried the medication.  She does find that the counseling has been helpful  No SI/HI, denies manic behavior

## 2018-12-28 NOTE — BH THERAPY
Group Therapy Checklist  Attendance: Attended  Attendance Duration (min):  (90 min.)  Number of Participants: 13  Program/Group: Partial Hospital Program  Topics Covered: Weekend planning  Participation: Limited verbal participation, Attentive (Pt shared that she will spend time with her family this weekend.  Eye contact is noticeable improved. )  Affect/Mood Range: Constricted  Affect/Mood Display: Congruent w/content, Sad  Cognition: Oriented, Alert  Evidence of Imminent Suicide Risk: No  Evidence of imminent homicide risk: No  Therapeutic Interventions: Cognitive clarification, Emotion clarification  Progress Toward Treatment Goal: Moderate improvement

## 2018-12-28 NOTE — ASSESSMENT & PLAN NOTE
Following celiac diet, History of colon resection.  No recent abdominal pain, distention, no blood in stool

## 2018-12-28 NOTE — BH THERAPY
Group Therapy Checklist  Attendance: Attended  Attendance Duration (min):  (60)  Number of Participants: 5  Program/Group: Partial Hospital Program  Topics Covered:  (Balance)  Participation: Active verbal participation  Affect/Mood Range: Normal range, Flexible  Affect/Mood Display: Congruent w/content  Cognition: Alert, Oriented  Evidence of Imminent Suicide Risk: No  Evidence of imminent homicide risk: No  Therapeutic Interventions: Cognitive clarification, Behavioral activation  Progress Toward Treatment Goal: Moderate improvement

## 2018-12-31 ENCOUNTER — HOSPITAL ENCOUNTER (OUTPATIENT)
Dept: BEHAVIORAL HEALTH | Facility: MEDICAL CENTER | Age: 53
End: 2018-12-31
Attending: PSYCHIATRY & NEUROLOGY
Payer: COMMERCIAL

## 2018-12-31 ENCOUNTER — TELEPHONE (OUTPATIENT)
Dept: BEHAVIORAL HEALTH | Facility: MEDICAL CENTER | Age: 53
End: 2018-12-31

## 2018-12-31 ENCOUNTER — DOCUMENTATION (OUTPATIENT)
Dept: BEHAVIORAL HEALTH | Facility: CLINIC | Age: 53
End: 2018-12-31

## 2018-12-31 VITALS — BODY MASS INDEX: 30.71 KG/M2 | WEIGHT: 202 LBS

## 2018-12-31 DIAGNOSIS — F41.9 INSOMNIA SECONDARY TO ANXIETY: ICD-10-CM

## 2018-12-31 DIAGNOSIS — F51.05 INSOMNIA SECONDARY TO ANXIETY: ICD-10-CM

## 2018-12-31 DIAGNOSIS — F41.9 ANXIETY: ICD-10-CM

## 2018-12-31 DIAGNOSIS — F22 PARANOIA (HCC): ICD-10-CM

## 2018-12-31 PROBLEM — F99 PSYCHIATRIC PROBLEM: Status: RESOLVED | Noted: 2018-12-18 | Resolved: 2018-12-31

## 2018-12-31 PROCEDURE — 99215 OFFICE O/P EST HI 40 MIN: CPT

## 2018-12-31 PROCEDURE — G0410 GRP PSYCH PARTIAL HOSP 45-50: HCPCS

## 2018-12-31 PROCEDURE — 99214 OFFICE O/P EST MOD 30 MIN: CPT | Performed by: PSYCHIATRY & NEUROLOGY

## 2018-12-31 PROCEDURE — G0177 OPPS/PHP; TRAIN & EDUC SERV: HCPCS

## 2018-12-31 PROCEDURE — G0176 OPPS/PHP;ACTIVITY THERAPY: HCPCS

## 2018-12-31 NOTE — BH THERAPY
Group Therapy Checklist  Attendance: Attended  Attendance Duration (min):  (60)  Number of Participants: 5  Program/Group: Partial Hospital Program  Topics Covered:  (creative art)  Participation: Active verbal participation  Affect/Mood Range: Normal range, Flexible  Affect/Mood Display: Congruent w/content  Cognition: Alert, Oriented  Evidence of Imminent Suicide Risk: No  Evidence of imminent homicide risk: No  Therapeutic Interventions: Socialization, Leisure and Recreation skills  Progress Toward Treatment Goal: Mild improvement
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
## 2018-12-31 NOTE — TELEPHONE ENCOUNTER
[facility]    Treatment Team Staffing    Patient Name: Shama Paula Program: [facility] Date: 12/31/2018     Attendees: Candice Fleming RN, [facility]; Francesca Ramos RN, [facility]; MICHAEL Roberts, [facility]; Agnes Solorzano RN and Camelia Azul MD    Patient's Progress toward Goals Listed on the Treatment Plan: slowing down racing thoughts. She expresses some doubt in her story of paranoia and intrigue; maybe her 's point of view could be considered.     1. Client's Participation When in Attendance Was: Active in a Positive Way    2. Counselor's Evaluation of Client's Progress: Positive Movement    3. Patient is attending group and individual sessions and is progressing well toward the treatment goals: yes      YES NO   A. Relapse During Program []  [x]    B. Requires physician review []  [x]    C. Referral to program inappropriate []  [x]    D. Non compliance with Treatment Plan []  [x]    E. Early treatment termination (lack of attendance) []  [x]     []  []      Comments: adjusting well to groups, able to drive herself to [facility] last week following her  being with her the first day.     Treatment Plan Review: - Continue Partial Hospital Program  - Next appointment scheduled:  12/31/2018  - Patient is in agreement with the above plan:  YES

## 2018-12-31 NOTE — BH THERAPY
Group Therapy Checklist  Attendance: Attended  Attendance Duration (min):  (60 minutes)  Number of Participants: 5  Program/Group: Partial Hospital Program  Topics Covered: [place] kindness  Participation: Limited verbal participation, Attentive  Affect/Mood Range: Normal range, Flexible  Affect/Mood Display: Congruent w/content  Cognition: Alert  Evidence of Imminent Suicide Risk: No  Evidence of imminent homicide risk: No  Therapeutic Interventions: Self-care skills  Progress Toward Treatment Goal: Mild improvement

## 2018-12-31 NOTE — BH THERAPY
Group Therapy Checklist  Attendance: Attended  Attendance Duration (min):  (90)  Number of Participants: 7  Program/Group: Partial Hospital Program  Topics Covered:  (process group)  Participation: Active verbal participation, Attentive, Supportive to other group members, Open to feedback  Affect/Mood Range: Normal range, Flexible  Affect/Mood Display: Congruent w/content  Cognition: Alert, Oriented  Evidence of Imminent Suicide Risk: No  Evidence of imminent homicide risk: No  Therapeutic Interventions: Emotion clarification, Supportive psychotherapy  Progress Toward Treatment Goal: Moderate improvement  Explored the archetype of the virgilio in relation to current fears and self esteem issues that block empowerment and success. Receptive to peer support.

## 2019-01-01 NOTE — CARE PLAN
Problem: Mood Instability Interfering with Adl’S  Goal: Effective symptom management    Intervention: Individual Counseling Sessions   [facility]  Therapy Progress Note    Patient Name: Shama Paula  Patient MRN: [id number]  Today's Date: 12/31/2018     Type of session:Individual psychotherapy  Length of session: 45 minutes  Persons in attendance:Patient    Subjective/New Info: individual session. Processed intake assessments. Shama's paranoia has her concerned about safety all the time: her own and others. She is trying to push through her fears, but her mind continues to be hypervigilant with who everyone is and what they have to say. She is always measuring situations and coming to conclusions to support her suspicions. Encouraged to take the Lexapro that was prescribed for her.     Objective/Observations:   Participation: Active verbal participation, Attentive, Engaged and Guarded   Grooming: Casual and Neat   Cognition: Alert and Fully Oriented   Eye contact: Limited   Mood: Anxious   Affect: Flexible, Full range, Congruent with content and Anxious   Thought process: Logical and Goal-directed   Speech: Rate within normal limits and Volume within normal limits   Other:     Diagnoses:   1. Paranoia (HCC)    2. Anxiety    3. Insomnia secondary to anxiety         Current risk:   SUICIDE: Not applicable   Homicide: Not applicable   Self-harm: Not applicable   Relapse: Not applicable   Other:    Safety Plan reviewed? Not Indicated   If evidence of imminent risk is present, intervention/plan:     Therapeutic Intervention(s): Clarify:  Clarify feelings and Clarify thoughts, Distress tolerance skills, Review treatment plan, Self-care skills, Stressors assessed and Supportive psychotherapy    Treatment Goal(s)/Objective(s) addressed: anxiety management, thought processes, family roles and medication compliance.      Progress toward Treatment Goals: Return to baseline    Plan:  - Continue Partial Hospital  "Program  - \"Homework\" recommendation: Recognize and replace self defeating thoughts.   - Next appointment scheduled:  1/2/19  - Patient is in agreement with the above plan:  YES    Agnes Solorzano R.N.  12/31/2018                                       "

## 2019-01-01 NOTE — BH THERAPY
Group Therapy Checklist  Attendance: Attended  Attendance Duration (min):  (60 min.)  Number of Participants: 13  Program/Group: Partial Hospital Program  Topics Covered: Assertiveness  Participation: Active verbal participation, Attentive  Affect/Mood Range: Flexible  Affect/Mood Display: Congruent w/content  Cognition: Oriented, Alert  Evidence of Imminent Suicide Risk: No  Evidence of imminent homicide risk: No  Therapeutic Interventions: Psychoeducation re: (Comment), Emotion clarification  Progress Toward Treatment Goal: Moderate improvement

## 2019-01-02 ENCOUNTER — HOSPITAL ENCOUNTER (OUTPATIENT)
Dept: BEHAVIORAL HEALTH | Facility: MEDICAL CENTER | Age: 54
End: 2019-01-02
Attending: PSYCHIATRY & NEUROLOGY
Payer: COMMERCIAL

## 2019-01-02 DIAGNOSIS — F22 PARANOIA (HCC): ICD-10-CM

## 2019-01-02 PROCEDURE — G0176 OPPS/PHP;ACTIVITY THERAPY: HCPCS

## 2019-01-02 PROCEDURE — G0177 OPPS/PHP; TRAIN & EDUC SERV: HCPCS

## 2019-01-02 PROCEDURE — G0410 GRP PSYCH PARTIAL HOSP 45-50: HCPCS

## 2019-01-02 NOTE — BH THERAPY
Group Therapy Checklist  Attendance: Attended  Attendance Duration (min):  (60)  Number of Participants: 13  Program/Group: Partial Hospital Program  Topics Covered: Chalk talk  Participation: Attentive  Affect/Mood Range: Normal range, Flexible  Affect/Mood Display: Congruent w/content  Cognition: Alert, Oriented  Evidence of Imminent Suicide Risk: No  Evidence of imminent homicide risk: No  Therapeutic Interventions: Cognitive clarification, Relapse prevention  Progress Toward Treatment Goal: Mild improvement

## 2019-01-02 NOTE — BH THERAPY
Group Therapy Checklist  Attendance: Attended  Attendance Duration (min):  (60)  Number of Participants: 6  Program/Group: Partial Hospital Program  Topics Covered:  (creative art)  Participation: Active verbal participation  Affect/Mood Range: Normal range, Flexible  Affect/Mood Display: Congruent w/content  Cognition: Alert, Oriented  Evidence of Imminent Suicide Risk: No  Evidence of imminent homicide risk: No  Therapeutic Interventions: Socialization  Progress Toward Treatment Goal: No change

## 2019-01-02 NOTE — BH THERAPY
Group Therapy Checklist  Attendance: Attended  Attendance Duration (min):  (60)  Number of Participants: 6  Program/Group: Partial Hospital Program  Topics Covered: Caring for Ourselves  Participation: Active verbal participation  Affect/Mood Range: Normal range, Flexible  Affect/Mood Display: Congruent w/content  Cognition: Alert, Oriented  Evidence of Imminent Suicide Risk: No  Evidence of imminent homicide risk: No  Therapeutic Interventions: Self-care skills  Progress Toward Treatment Goal: Mild improvement

## 2019-01-03 ENCOUNTER — HOSPITAL ENCOUNTER (OUTPATIENT)
Dept: BEHAVIORAL HEALTH | Facility: MEDICAL CENTER | Age: 54
End: 2019-01-03
Attending: PSYCHIATRY & NEUROLOGY
Payer: COMMERCIAL

## 2019-01-03 DIAGNOSIS — F22 PARANOIA (HCC): ICD-10-CM

## 2019-01-03 PROCEDURE — G0176 OPPS/PHP;ACTIVITY THERAPY: HCPCS

## 2019-01-03 PROCEDURE — G0410 GRP PSYCH PARTIAL HOSP 45-50: HCPCS

## 2019-01-03 PROCEDURE — G0177 OPPS/PHP; TRAIN & EDUC SERV: HCPCS

## 2019-01-03 NOTE — BH THERAPY
Group Therapy Checklist  Attendance: Attended  Attendance Duration (min):  (120)  Number of Participants: 5  Program/Group: Partial Hospital Program  Topics Covered:  (the Shift)  Participation: Attentive  Affect/Mood Range: Normal range, Flexible  Affect/Mood Display: Congruent w/content  Cognition: Alert, Oriented  Evidence of Imminent Suicide Risk: No  Evidence of imminent homicide risk: No  Therapeutic Interventions: Emotion clarification, Values clarification  Progress Toward Treatment Goal: Moderate improvement

## 2019-01-03 NOTE — CARE PLAN
Problem: Mood Instability Interfering with Adl’S  Goal: Effective symptom management    Intervention: Individual Counseling Sessions   [facility]  Therapy Progress Note    Patient Name: Shama Paula  Patient MRN: [id number]  Today's Date: 1/3/2019     Type of session:Individual psychotherapy  Length of session: 30 minutes  Persons in attendance:Patient    Subjective/New Info: individual session. Shama started on her medication. Processed paranoia and scanning throughout her day. She is feeling better with the PHP routine, especially the security here. She is on high alert with text messages, preferring to hear someone's voice to know it's really them. She will postpone job search until next week, she uses dialogue about getting a job to give the impression that she's alright. She is constantly deflecting and holding back what is really running through her mind.     Objective/Observations:   Participation: Active verbal participation, Attentive, Engaged and Open to feedback   Grooming: Casual and Neat   Cognition: Alert and Fully Oriented   Eye contact: Limited   Mood: Depressed and Anxious   Affect: Constricted   Thought process: Logical and Goal-directed   Speech: Rate within normal limits and Volume within normal limits   Other:     Diagnoses:   1. Paranoia (HCC)         Current risk:   SUICIDE: Not applicable   Homicide: Not applicable   Self-harm: Not applicable   Relapse: Not applicable   Other:    Safety Plan reviewed? Not Indicated   If evidence of imminent risk is present, intervention/plan:     Therapeutic Intervention(s): Distress tolerance skills, Review treatment plan, Self-care skills, Stressors assessed and Supportive psychotherapy    Treatment Goal(s)/Objective(s) addressed: medication management, symptom management     Progress toward Treatment Goals: Mild improvement    Plan:  - Continue Partial Hospital Program  - Next appointment scheduled:  1/4/2019  - Patient is in agreement with  the above plan:  YES    Agnes Solorzano R.N.  1/3/2019

## 2019-01-03 NOTE — ADDENDUM NOTE
Encounter addended by: Agnes Solorzano R.N. on: 1/3/2019  3:51 PM<BR>    Actions taken: Care Plan problems brought forward, Care Plan progress modified, Sign clinical note, Administrative Information edited

## 2019-01-03 NOTE — BH THERAPY
Group Therapy Checklist  Attendance: Attended  Attendance Duration (min):  (90)  Number of Participants: 13  Program/Group: Partial Hospital Program  Topics Covered:  (process group)  Participation: Active verbal participation, Attentive, Supportive to other group members, Open to feedback  Affect/Mood Range: Normal range, Flexible  Affect/Mood Display: Congruent w/content  Cognition: Alert, Oriented  Evidence of Imminent Suicide Risk: No  Evidence of imminent homicide risk: No  Therapeutic Interventions: Emotion clarification, Supportive psychotherapy  Progress Toward Treatment Goal: Moderate improvement  Patient actively participated in process group.  Addressed active unresolved emotional issues. Taught some emotional skills and techniques to assist with the emotional skill building that relates to their presenting issues and active treatment plan.

## 2019-01-03 NOTE — BH THERAPY
Group Therapy Checklist  Attendance: Attended  Attendance Duration (min): 0-15 (90 min.)  Number of Participants: 14  Program/Group: Partial Hospital Program  Topics Covered:  (Group Therapy)  Participation: Active verbal participation (Pt stated loss of family is a deep fear and her highest hope is cennectedness to others and no more isolating. )  Affect/Mood Range: Constricted  Cognition: Oriented, Alert  Evidence of Imminent Suicide Risk: No  Evidence of imminent homicide risk: No  Therapeutic Interventions: Cognitive clarification, Emotion clarification  Progress Toward Treatment Goal: Moderate improvement

## 2019-01-03 NOTE — BH THERAPY
Group Therapy Checklist  Attendance: Attended  Attendance Duration (min):  (60 min.)  Number of Participants: 11  Program/Group: Partial Hospital Program  Topics Covered: Mindfulness  Participation: Active verbal participation, Attentive  Affect/Mood Range: Constricted  Affect/Mood Display: Congruent w/content  Cognition: Oriented, Alert  Evidence of Imminent Suicide Risk: No  Evidence of imminent homicide risk: No  Therapeutic Interventions: Psychoeducation re: (Comment), Emotion clarification  Progress Toward Treatment Goal: Moderate improvement

## 2019-01-04 ENCOUNTER — HOSPITAL ENCOUNTER (OUTPATIENT)
Dept: BEHAVIORAL HEALTH | Facility: MEDICAL CENTER | Age: 54
End: 2019-01-04
Attending: PSYCHIATRY & NEUROLOGY
Payer: COMMERCIAL

## 2019-01-04 DIAGNOSIS — F22 PARANOIA (HCC): ICD-10-CM

## 2019-01-04 PROCEDURE — G0177 OPPS/PHP; TRAIN & EDUC SERV: HCPCS

## 2019-01-04 PROCEDURE — G0410 GRP PSYCH PARTIAL HOSP 45-50: HCPCS

## 2019-01-04 PROCEDURE — G0176 OPPS/PHP;ACTIVITY THERAPY: HCPCS

## 2019-01-04 NOTE — BH THERAPY
Group Therapy Checklist  Attendance: Attended  Attendance Duration (min):  (60)  Number of Participants: 13  Program/Group: Partial Hospital Program  Topics Covered: Belief Systems  Participation: Active verbal participation, Attentive  Affect/Mood Range: Normal range, Flexible  Affect/Mood Display: Congruent w/content  Cognition: Alert, Oriented  Evidence of Imminent Suicide Risk: No  Evidence of imminent homicide risk: No  Therapeutic Interventions: Cognitive clarification, Values clarification  Progress Toward Treatment Goal: Moderate improvement

## 2019-01-04 NOTE — BH THERAPY
Group Therapy Checklist  Attendance: Attended  Attendance Duration (min):  (60)  Number of Participants: 4  Program/Group: Partial Hospital Program  Topics Covered:  (creative art)  Participation: Active verbal participation  Affect/Mood Range: Normal range, Flexible  Affect/Mood Display: Congruent w/content  Cognition: Alert, Oriented  Evidence of Imminent Suicide Risk: No  Evidence of imminent homicide risk: No  Therapeutic Interventions: Socialization  Progress Toward Treatment Goal: Moderate improvement

## 2019-01-04 NOTE — BH THERAPY
Group Therapy Checklist  Attendance: Attended  Attendance Duration (min):  (60)  Number of Participants: 4  Program/Group: Partial Hospital Program  Topics Covered:  (Habit)  Participation: Active verbal participation  Affect/Mood Range: Normal range, Flexible  Affect/Mood Display: Congruent w/content  Cognition: Alert, Oriented  Evidence of Imminent Suicide Risk: No  Evidence of imminent homicide risk: No  Therapeutic Interventions: Cognitive clarification, Behavioral activation  Progress Toward Treatment Goal: Moderate improvement

## 2019-01-04 NOTE — BH THERAPY
Group Therapy Checklist  Attendance: Attended  Attendance Duration (min):  (90 min.)  Number of Participants: 13  Program/Group: Partial Hospital Program  Topics Covered: Weekend planning  Participation: Limited verbal participation, Attentive (Pt stated she will be home doing her chores and cooking over the weekend.  Mood seemed stable. )  Affect/Mood Range: Constricted  Affect/Mood Display: Congruent w/content  Cognition: Oriented, Alert  Evidence of Imminent Suicide Risk: No  Evidence of imminent homicide risk: No  Therapeutic Interventions: Cognitive clarification, Emotion clarification  Progress Toward Treatment Goal: Moderate improvement

## 2019-01-07 ENCOUNTER — HOSPITAL ENCOUNTER (OUTPATIENT)
Dept: BEHAVIORAL HEALTH | Facility: MEDICAL CENTER | Age: 54
End: 2019-01-07
Attending: PSYCHIATRY & NEUROLOGY
Payer: COMMERCIAL

## 2019-01-07 VITALS
SYSTOLIC BLOOD PRESSURE: 137 MMHG | HEART RATE: 68 BPM | WEIGHT: 205 LBS | DIASTOLIC BLOOD PRESSURE: 91 MMHG | BODY MASS INDEX: 31.17 KG/M2

## 2019-01-07 DIAGNOSIS — F51.05 INSOMNIA SECONDARY TO ANXIETY: ICD-10-CM

## 2019-01-07 DIAGNOSIS — F41.9 ANXIETY: ICD-10-CM

## 2019-01-07 DIAGNOSIS — F22 PARANOIA (HCC): ICD-10-CM

## 2019-01-07 DIAGNOSIS — F41.9 INSOMNIA SECONDARY TO ANXIETY: ICD-10-CM

## 2019-01-07 PROCEDURE — G0176 OPPS/PHP;ACTIVITY THERAPY: HCPCS

## 2019-01-07 PROCEDURE — 99214 OFFICE O/P EST MOD 30 MIN: CPT | Performed by: PSYCHIATRY & NEUROLOGY

## 2019-01-07 PROCEDURE — G0177 OPPS/PHP; TRAIN & EDUC SERV: HCPCS

## 2019-01-07 PROCEDURE — G0410 GRP PSYCH PARTIAL HOSP 45-50: HCPCS

## 2019-01-07 PROCEDURE — 99214 OFFICE O/P EST MOD 30 MIN: CPT

## 2019-01-07 RX ORDER — ESCITALOPRAM OXALATE 5 MG/1
5 TABLET ORAL DAILY
Qty: 30 TAB | Refills: 0
Start: 2019-01-07 | End: 2019-01-18

## 2019-01-07 NOTE — BH THERAPY
Group Therapy Checklist  Attendance: Attended  Attendance Duration (min):  (90)  Number of Participants: 4  Program/Group: Partial Hospital Program  Topics Covered:  (process group)  Participation: Active verbal participation, Attentive, Supportive to other group members, Open to feedback  Affect/Mood Range: Normal range  Affect/Mood Display: Congruent w/content  Cognition: Alert, Oriented  Evidence of Imminent Suicide Risk: No  Evidence of imminent homicide risk: No  Therapeutic Interventions: Emotion clarification, Supportive psychotherapy  Progress Toward Treatment Goal: Moderate improvement  Shama processed feeling calmer, less fearful. She enjoys sewing and is looking at activating that hobby again, pulling out mannequins and machine. She finds it to be creative and drops her into a serene place. Receptive to peer support.

## 2019-01-07 NOTE — BH THERAPY
Group Therapy Checklist  Attendance: Attended  Attendance Duration (min):  (60 min.)  Program/Group: Partial Hospital Program  Topics Covered: Addiction behaviors  Participation: Active verbal participation, Attentive  Affect/Mood Range: Flexible  Affect/Mood Display: Congruent w/content  Cognition: Oriented, Alert  Evidence of Imminent Suicide Risk: No  Evidence of imminent homicide risk: No  Therapeutic Interventions: Psychoeducation re: (Comment), Emotion clarification  Progress Toward Treatment Goal: Moderate improvement

## 2019-01-07 NOTE — PROGRESS NOTES
PARTIAL HOSPITALIZATION PROGRAM FOLLOW-UP NOTE      Chief Complaint   Patient presents with   • Follow-Up     anxiety       History Of Present Illness:  Shama Paula is a 53 y.o. old female with hypothyroidism, anxiety, paranoia seen today as PHP follow up, was last seen 1 week ago.  She reports feeling less anxious since she was started on low-dose Lexapro on her last appointment.  She has noticed that she is calmer than before.  She did not notice any side effects with Lexapro and is not using Klonopin she is afraid to get addicted.  She was noted to have more insight into her thoughts today.  She feels that she is able to distract herself from her thoughts and is not worried about being watched over like before.  She was okay with me putting Lexapro as her current medication in the chart.  She is still struggling with going out of her home when it gets dark but was able to make some unscheduled stops with her  last week.  She feels that 1 or 2 months ago she would not have been able to make those stops and would have asked her  to go back home but she was able to push through her anxiety.  She has been focusing more on her hobbies and household chores.  She is still looking forward to going back to her work where she has worked for over 6+ years.  She denies any problems with her appetite but is still not getting a good night sleep.  She is not using Klonopin and wants to use it when she is feeling extremely anxious.  She denied any current mood symptoms.    Social History:   She is  and has 2 sons and lives with her family in [place].  She is working as a  at a local 's office.    Substance Use:  Alcohol - Denies  Nicotine - Denies  Illicit drugs - Denies    Medications:  Current Outpatient Prescriptions   Medication Sig Dispense Refill   • escitalopram (LEXAPRO) 5 MG tablet Take 1 Tab by mouth every day. 30 Tab 0   • clonazePAM (KLONOPIN) 0.5 MG Tab Take one tablet by  "mouth at bed time as needed for 30 days. 20 Tab 0     No current facility-administered medications for this encounter.        Review Of Systems:    Constitutional - Negative for fatigue  Respiratory - Negative for shortness of breath, cough  CVS - Negative for chest pain, palpitations  GI - Negative for nausea, vomiting, abdominal pain, diarrhea, constipation  Musculoskeletal - Negative for back pain  Neurological - Negative for headaches  Psychiatric - Positive for anxiety, paranoia, poor sleep    Physical Examination:  Vital signs: /91   Pulse 68   Wt 93 kg (205 lb)   LMP 12/06/2012   BMI 31.17 kg/m²     Musculoskeletal: Normal gait. No abnormal movements.     Mental Status Evaluation:   General: Young white female, dressed in casual attire, good grooming and hygiene, in no apparent distress, calm and cooperative, good eye contact, no psychomotor agitation or retardation  Orientation: Alert and oriented to person, place and time  Recent and remote memory: Grossly intact  Attention span and concentration: Grossly intact  Speech: Spontaneous, normal rate, rhythm and tone  Thought Process: Linear, logical and goal directed  Thought Content: Denies suicidal or homicidal ideations, intent or plan  Perception: Denies auditory or visual hallucinations. No delusions noted  Associations: Intact  Language: Appropriate  Fund of knowledge and vocabulary: Grossly adequate  Mood: \"am fine\"  Affect: Anxious at times, mood congruent  Insight: Partial  Judgment: Good      Impression:  1.  Paranoia (likely due to anxiety rather than delusional disorder)  2.  Anxiety secondary to paranoia  3.  Insomnia secondary to anxiety    Plan:  1.  Continue Lexapro 5 mg daily for anxiety.  It appears that her paranoia is due to underlying anxiety rather than delusional disorder.  She is developing good insight into her thoughts and actions which is less common with delusional disorder  2.  Continue Klonopin 0.5 mg daily as needed for " anxiety and her insomnia  3.  Continue PHP for this week and if she continues to do well can be transitioned over to IOP next week.    I certify that continued Partial Hospitalization services are medically necessary to improve and/or maintain the patient's condition, functional level, prevent relapse or hospitalization and that this could not be done at a less intensive level of care due to:     [x] Persistence of significant psychiatric symptomology.  [x] Severity of patient's condition necessitates a continued intense level of service responsive to the patient's problems and accommodated by the function of the treatment program.    [x] Individualized, active treatment plan goals which are directed toward alleviation of the symptomology that precipitated the admission have not yet been attained.     Follow up in 1 week or sooner if symptoms worsen    The proposed treatment plan was discussed with the patient who was provided the opportunity to ask questions and make suggestions regarding alternative treatment. Patient verbalized understanding and expressed agreement with the plan.     Camelia Azul M.D.  01/07/19    This note was created using voice recognition software (Dragon). The accuracy of the dictation is limited by the abilities of the software. I have reviewed the note prior to signing, however some errors in grammar and context are still possible. If you have any questions related to this note please do not hesitate to contact our office.

## 2019-01-07 NOTE — BH THERAPY
Group Therapy Checklist  Attendance: Attended  Attendance Duration (min):  (60 minutes)  Number of Participants: 1  Program/Group: Partial Hospital Program  Topics Covered: Relaxation tech's  Participation: Active verbal participation, Attentive  Affect/Mood Range: Normal range, Flexible  Affect/Mood Display: Congruent w/content  Cognition: Alert, Oriented  Evidence of Imminent Suicide Risk: No  Evidence of imminent homicide risk: No  Therapeutic Interventions: Relaxationexercise, Mindfulness exercise  Progress Toward Treatment Goal: Moderate improvement

## 2019-01-07 NOTE — BH THERAPY
Group Therapy Checklist  Attendance: Attended  Attendance Duration (min):  (60)  Number of Participants: 1  Program/Group: Partial Hospital Program  Topics Covered:  (creative art)  Participation: Active verbal participation  Affect/Mood Range: Normal range, Flexible  Affect/Mood Display: Congruent w/content  Cognition: Alert, Oriented  Evidence of Imminent Suicide Risk: No  Evidence of imminent homicide risk: No  Therapeutic Interventions: Socialization, Leisure and Recreation skills  Progress Toward Treatment Goal: Moderate improvement

## 2019-01-08 ENCOUNTER — HOSPITAL ENCOUNTER (OUTPATIENT)
Dept: BEHAVIORAL HEALTH | Facility: MEDICAL CENTER | Age: 54
End: 2019-01-08
Attending: PSYCHIATRY & NEUROLOGY
Payer: COMMERCIAL

## 2019-01-08 DIAGNOSIS — F41.9 ANXIETY: ICD-10-CM

## 2019-01-08 DIAGNOSIS — F22 PARANOIA (HCC): ICD-10-CM

## 2019-01-08 PROCEDURE — G0176 OPPS/PHP;ACTIVITY THERAPY: HCPCS

## 2019-01-08 PROCEDURE — G0177 OPPS/PHP; TRAIN & EDUC SERV: HCPCS

## 2019-01-08 PROCEDURE — G0410 GRP PSYCH PARTIAL HOSP 45-50: HCPCS

## 2019-01-08 NOTE — BH THERAPY
Group Therapy Checklist  Attendance: Attended  Attendance Duration (min):  (60)  Number of Participants: 8  Program/Group: Partial Hospital Program  Topics Covered: Values based action  Participation: Active verbal participation  Affect/Mood Range: Normal range, Flexible  Affect/Mood Display: Congruent w/content  Cognition: Alert, Oriented  Evidence of Imminent Suicide Risk: No  Evidence of imminent homicide risk: No  Therapeutic Interventions: Values clarification, Cognitive clarification, Behavioral activation  Progress Toward Treatment Goal: Moderate improvement

## 2019-01-08 NOTE — BH THERAPY
Group Therapy Checklist  Attendance: Attended  Attendance Duration (min):  (60)  Number of Participants: 1  Program/Group: Partial Hospital Program  Topics Covered: Active listening  Participation: Active verbal participation  Affect/Mood Range: Normal range, Flexible  Affect/Mood Display: Congruent w/content  Cognition: Alert, Oriented  Evidence of Imminent Suicide Risk: No  Evidence of imminent homicide risk: No  Therapeutic Interventions: Communication skills  Progress Toward Treatment Goal: Moderate improvement

## 2019-01-08 NOTE — BH THERAPY
Group Therapy Checklist  Attendance: Attended  Attendance Duration (min):  (60 min.)  Number of Participants: 1  Program/Group: Partial Hospital Program  Topics Covered:  (Activity therapy)  Participation: Active verbal participation, Attentive, Open to feedback  Affect/Mood Range: Flexible  Affect/Mood Display: Congruent w/content  Cognition: Oriented, Alert  Evidence of Imminent Suicide Risk: No  Evidence of imminent homicide risk: No  Therapeutic Interventions: Psychoeducation re: (Comment), Emotion clarification  Progress Toward Treatment Goal: Moderate improvement

## 2019-01-08 NOTE — BH THERAPY
Group Therapy Checklist  Attendance: Attended  Attendance Duration (min):  (90 min.)  Number of Participants: 8  Program/Group: Partial Hospital Program  Topics Covered:  (Group Therapy)  Participation: Active verbal participation, Attentive, Supportive to other group members (Pt participated in the guided imagery of self to the analogy of a rosebush and abdi a picture of her jhonny bush and shared it with group. )  Affect/Mood Range: Flexible  Affect/Mood Display: Congruent w/content  Cognition: Oriented, Alert  Evidence of Imminent Suicide Risk: No  Evidence of imminent homicide risk: No  Therapeutic Interventions: Emotion clarification, Cognitive clarification  Progress Toward Treatment Goal: Moderate improvement

## 2019-01-09 ENCOUNTER — HOSPITAL ENCOUNTER (OUTPATIENT)
Dept: BEHAVIORAL HEALTH | Facility: MEDICAL CENTER | Age: 54
End: 2019-01-09
Attending: PSYCHIATRY & NEUROLOGY
Payer: COMMERCIAL

## 2019-01-09 DIAGNOSIS — F41.9 ANXIETY: ICD-10-CM

## 2019-01-09 PROCEDURE — G0176 OPPS/PHP;ACTIVITY THERAPY: HCPCS

## 2019-01-09 PROCEDURE — G0410 GRP PSYCH PARTIAL HOSP 45-50: HCPCS

## 2019-01-09 PROCEDURE — G0177 OPPS/PHP; TRAIN & EDUC SERV: HCPCS

## 2019-01-09 NOTE — BH THERAPY
Group Therapy Checklist  Attendance: Attended  Attendance Duration (min):  (60)  Number of Participants: 6  Program/Group: Partial Hospital Program  Topics Covered: 12 Step orientation  Participation: Active verbal participation, Attentive  Affect/Mood Range: Normal range, Flexible  Affect/Mood Display: Congruent w/content  Cognition: Alert, Oriented  Evidence of Imminent Suicide Risk: No  Evidence of imminent homicide risk: No  Therapeutic Interventions: Cognitive clarification, Relapse prevention  Progress Toward Treatment Goal: Moderate improvement

## 2019-01-09 NOTE — BH THERAPY
Group Therapy Checklist  Attendance: Attended  Number of Participants:  (1)  Program/Group: Partial Hospital Program  Topics Covered: Letting Go  Participation: Attentive  Affect/Mood Range: Normal range  Affect/Mood Display: Congruent w/content  Cognition: Alert, Oriented  Evidence of Imminent Suicide Risk: No  Evidence of imminent homicide risk: No  Therapeutic Interventions: Emotion clarification, Cognitive clarification  Progress Toward Treatment Goal: Moderate improvement

## 2019-01-09 NOTE — ADDENDUM NOTE
Encounter addended by: Vira Garcia R.N. on: 1/9/2019  3:08 PM<BR>    Actions taken: Sign clinical note

## 2019-01-09 NOTE — ADDENDUM NOTE
Encounter addended by: AMRITA Tierney on: 1/9/2019  3:39 PM<BR>    Actions taken: Sign clinical note

## 2019-01-09 NOTE — ADDENDUM NOTE
Encounter addended by: Agnes Solorzano R.N. on: 1/9/2019  3:29 PM<BR>    Actions taken: Care Plan problems brought forward, Care Plan progress modified, Sign clinical note, Administrative Information edited

## 2019-01-09 NOTE — CARE PLAN
Problem: Mood Instability Interfering with Adl’S  Goal: Effective symptom management    Intervention: Individual Counseling Sessions   [facility]  Therapy Progress Note    Patient Name: Shama Paula  Patient MRN: [id number]  Today's Date: 1/9/2019     Type of session:Individual psychotherapy  Length of session: 30 minutes  Persons in attendance:Patient    Subjective/New Info: processed anxiety vs paranoia. Shama expressed feeling calmer, feels the medication may be helpful. 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 She feels things are improving in her relationship as she speaks less and less of her fears and phobias.     Objective/Observations:   Participation: Active verbal participation, Attentive, Engaged and Open to feedback   Grooming: Casual and Neat   Cognition: Alert and Fully Oriented   Eye contact: Good   Mood: anxious   Affect: Flexible, Full range and Congruent with content   Thought process: Logical and Goal-directed   Speech: Rate within normal limits and Volume within normal limits   Other:     Diagnoses:   1. Anxiety         Current risk:   SUICIDE: Not applicable   Homicide: Not applicable   Self-harm: Not applicable   Relapse: Not applicable   Other:    Safety Plan reviewed? Not Indicated   If evidence of imminent risk is present, intervention/plan:     Therapeutic Intervention(s): Clarify:  Clarify thoughts, Communication skills, Distress tolerance skills, Self-care skills, Stressors assessed and Supportive psychotherapy    Treatment Goal(s)/Objective(s) addressed: negative thought process and realistic responses, self care     Progress toward Treatment Goals: Moderate improvement    Plan:  - Continue Partial Hospital Program  - Next appointment scheduled:  1/10/2019  - Patient is in agreement with the above plan:  YES    Agnes Solorzano R.N.  1/9/2019                                        The patients wife is calling in for the results of the PSA. She says it would  Be ok to leave results on vm if not home.

## 2019-01-10 ENCOUNTER — HOSPITAL ENCOUNTER (OUTPATIENT)
Dept: BEHAVIORAL HEALTH | Facility: MEDICAL CENTER | Age: 54
End: 2019-01-10
Attending: PSYCHIATRY & NEUROLOGY
Payer: COMMERCIAL

## 2019-01-10 DIAGNOSIS — F41.9 ANXIETY: ICD-10-CM

## 2019-01-10 PROCEDURE — G0177 OPPS/PHP; TRAIN & EDUC SERV: HCPCS

## 2019-01-10 PROCEDURE — G0410 GRP PSYCH PARTIAL HOSP 45-50: HCPCS

## 2019-01-10 PROCEDURE — G0176 OPPS/PHP;ACTIVITY THERAPY: HCPCS

## 2019-01-10 NOTE — BH THERAPY
Group Therapy Checklist  Attendance: Attended  Attendance Duration (min):  (60)  Number of Participants: 1  Program/Group: Partial Hospital Program  Topics Covered: Weekend planning  Participation: Active verbal participation, Attentive  Affect/Mood Range: Normal range, Flexible  Affect/Mood Display: Congruent w/content  Cognition: Alert, Oriented  Evidence of Imminent Suicide Risk: No  Evidence of imminent homicide risk: No  Therapeutic Interventions: Goal-setting, Self-care skills  Progress Toward Treatment Goal: Moderate improvement

## 2019-01-10 NOTE — BH THERAPY
Group Therapy Checklist  Attendance: Attended  Attendance Duration (min):  (90)  Number of Participants: 10  Program/Group: Partial Hospital Program  Topics Covered:  (process group)  Participation: Active verbal participation, Attentive, Supportive to other group members, Open to feedback  Affect/Mood Range: Normal range, Flexible  Affect/Mood Display: Congruent w/content  Cognition: Alert, Oriented  Evidence of Imminent Suicide Risk: No  Evidence of imminent homicide risk: No  Therapeutic Interventions: Emotion clarification, Supportive psychotherapy  Progress Toward Treatment Goal: Moderate improvement  Pt explored relationship questions, patterns, triggers and feelings as they relate to childhood events and dynamics. Processed familiar behavioral dynamics; taught emotional skills. Receptive to peer support.

## 2019-01-10 NOTE — BH THERAPY
Group Therapy Checklist  Attendance: Attended  Attendance Duration (min):  (60)  Number of Participants: 1  Program/Group: Partial Hospital Program  Topics Covered:  (interactive game)  Participation: Active verbal participation  Affect/Mood Range: Normal range, Flexible  Affect/Mood Display: Congruent w/content  Cognition: Alert, Oriented  Evidence of Imminent Suicide Risk: No  Evidence of imminent homicide risk: No  Therapeutic Interventions: Socialization, Play therapy  Progress Toward Treatment Goal: Moderate improvement

## 2019-01-10 NOTE — CARE PLAN
Problem: Mood Instability Interfering with Adl’S  Goal: Effective symptom management    Intervention: Interactive Group Therapy   [facility]  The[facility]    Patient Name: Shama Paula  Patient MRN: [id number]  Today's Date: 1/10/2019     Type of session:Individual psychotherapy  Length of session: 30 minutes  Persons in attendance:Patient    Subjective/New Info: discussed interactive games and family time. Shama reflected on family time and how special it is when her family does things together: attending events, playing board games or watching a movie. She is making weekend plans to interact more with them before they start back to school next week. Also, she hopes to visit her mother-in-law who is supportive and one or two people who knows she is here (the other is her ). Shama is more forthcoming in group about her family life and work tasks but continues to stay away from her fears and anxiety, sweeping it aside.     Objective/Observations:   Participation: Active verbal participation, Attentive, Engaged and Open to feedback   Grooming: Casual and Neat   Cognition: Alert and Fully Oriented   Eye contact: Good   Mood: Euthymic   Affect: Flexible, Full range and Congruent with content   Thought process: Logical and Goal-directed   Speech: Rate within normal limits and Volume within normal limits   Other:     Diagnoses:   1. Anxiety         Current risk:   SUICIDE: Not applicable   Homicide: Not applicable   Self-harm: Not applicable   Relapse: Not applicable   Other:    Safety Plan reviewed? Not Indicated   If evidence of imminent risk is present, intervention/plan:     Therapeutic Intervention(s): Clarify:  Clarify feelings, Clarify thoughts and Clarify values, Communication skills, Leisure and recreation skills, Parenting/familial roles addressed and Supportive psychotherapy    Treatment Goal(s)/Objective(s) addressed: family roles, activity and group therapy, recovery planning      Progress toward Treatment Goals: Moderate improvement    Plan:  - Continue Partial Hospital Program  - Next appointment scheduled:  1/11/2019  - Transition toward termination  - Patient is in agreement with the above plan:  YES    Agnes Solorzano R.N.  1/10/2019

## 2019-01-10 NOTE — BH THERAPY
Group Therapy Checklist  Attendance: Attended  Attendance Duration (min):  (60 min.)  Number of Participants: 10  Program/Group: Partial Hospital Program  Topics Covered: Relationships in Recovery  Participation: Active verbal participation  Affect/Mood Range: Flexible  Affect/Mood Display: Congruent w/content  Cognition: Oriented, Alert  Evidence of Imminent Suicide Risk: No  Evidence of imminent homicide risk: No  Therapeutic Interventions: Psychoeducation re: (Comment), Emotion clarification  Progress Toward Treatment Goal: Moderate improvement

## 2019-01-11 ENCOUNTER — HOSPITAL ENCOUNTER (OUTPATIENT)
Dept: BEHAVIORAL HEALTH | Facility: MEDICAL CENTER | Age: 54
End: 2019-01-11
Attending: PSYCHIATRY & NEUROLOGY
Payer: COMMERCIAL

## 2019-01-11 ENCOUNTER — TELEPHONE (OUTPATIENT)
Dept: BEHAVIORAL HEALTH | Facility: MEDICAL CENTER | Age: 54
End: 2019-01-11

## 2019-01-11 DIAGNOSIS — F41.9 ANXIETY: ICD-10-CM

## 2019-01-11 PROCEDURE — G0410 GRP PSYCH PARTIAL HOSP 45-50: HCPCS

## 2019-01-11 PROCEDURE — G0177 OPPS/PHP; TRAIN & EDUC SERV: HCPCS

## 2019-01-11 PROCEDURE — G0176 OPPS/PHP;ACTIVITY THERAPY: HCPCS

## 2019-01-11 NOTE — BH THERAPY
Group Therapy Checklist  Attendance: Attended  Attendance Duration (min):  (60)  Number of Participants: 1  Program/Group: Partial Hospital Program  Topics Covered:  (Flow and mindfulness)  Participation: Active verbal participation  Affect/Mood Range: Normal range, Flexible  Affect/Mood Display: Congruent w/content  Cognition: Alert, Oriented  Evidence of Imminent Suicide Risk: No  Evidence of imminent homicide risk: No  Therapeutic Interventions: Cognitive clarification  Progress Toward Treatment Goal: Moderate improvement

## 2019-01-11 NOTE — TELEPHONE ENCOUNTER
[facility]  TRANSFER/DISCHARGE SUMMARY FORM    HHPI / SCP: yes Other Ins.: no     Patient Name: Shama Paula  Admission Date: 18  Level of Care Attended:  Partial Hosp : 1965  Transfer/Discharge Date: MRN: [id number]  19       SIGNIFICANT FINDINGS/CLINICAL IMPRESSION:   DSM Codes:   PHP    ICD10 Codes:  F41.1    Additional problems identified via assessment: safety and security sensitivity when it comes to her job search, taking it slow and considering a temp agency    Treatment Components in Which Patient Participated (check all that apply):  Education group(s), 1:1 teaching/therapy, Family education, Medication Management and Group Therapy    Summary of Course of Treatment: Active participation all education forums, process groups, activity therapies and individual counseling sessions.       Condition at Time of Transfer/Discharge: Met treatment goals.    [x] Medications Reviewed with Copy to Patient    Referred to: Refer to [facility]: Intensive Outpatient Program     Patient is in agreement with discharge plan: yes    Agnes Solorzano R.N.

## 2019-01-11 NOTE — BH THERAPY
Group Therapy Checklist  Attendance: Attended  Attendance Duration (min):  (60)  Number of Participants: 12  Program/Group: Partial Hospital Program  Topics Covered: Codependency  Participation: Active verbal participation  Affect/Mood Range: Normal range, Flexible  Affect/Mood Display: Congruent w/content  Cognition: Alert, Oriented  Evidence of Imminent Suicide Risk: No  Evidence of imminent homicide risk: No  Therapeutic Interventions: Cognitive clarification  Progress Toward Treatment Goal: Moderate improvement

## 2019-01-11 NOTE — BH THERAPY
Group Therapy Checklist  Attendance: Attended  Attendance Duration (min):  (60)  Number of Participants: 1  Program/Group: Partial Hospital Program  Topics Covered:  (creative art)  Participation: Active verbal participation, Attentive  Affect/Mood Display: Congruent w/content  Cognition: Alert, Oriented  Evidence of Imminent Suicide Risk: No  Evidence of imminent homicide risk: No  Therapeutic Interventions: Socialization, Leisure and Recreation skills  Progress Toward Treatment Goal: Moderate improvement

## 2019-01-12 NOTE — BH THERAPY
Group Therapy Checklist  Attendance: Attended  Attendance Duration (min):  (90 min.)  Number of Participants: 12  Program/Group: Partial Hospital Program  Topics Covered:  (Group Therapy)  Participation: Active verbal participation, Attentive (Pt will be home with family this weekend.   Mood and disposition appear to be imporving. )  Affect/Mood Range: Normal range  Affect/Mood Display: Congruent w/content  Cognition: Oriented, Alert  Evidence of Imminent Suicide Risk: No  Evidence of imminent homicide risk: No  Therapeutic Interventions: Cognitive clarification, Emotion clarification  Progress Toward Treatment Goal: Moderate improvement

## 2019-01-15 ENCOUNTER — HOSPITAL ENCOUNTER (OUTPATIENT)
Dept: BEHAVIORAL HEALTH | Facility: MEDICAL CENTER | Age: 54
End: 2019-01-15
Attending: PSYCHIATRY & NEUROLOGY
Payer: COMMERCIAL

## 2019-01-15 DIAGNOSIS — F41.9 ANXIETY: ICD-10-CM

## 2019-01-15 PROCEDURE — 90853 GROUP PSYCHOTHERAPY: CPT

## 2019-01-15 NOTE — BH THERAPY
Group Therapy Checklist  Attendance: Attended  Attendance Duration (min):  (90 min.)  Number of Participants: 11  Program/Group: Intensive Outpatient Program  Topics Covered:  (Group Therapy)  Participation:  (Shama was mostly quiet today but attentively listening. She shared that she got out of the house and went to the grocery store which was big for her. )  Affect/Mood Range: Constricted  Affect/Mood Display: Congruent w/content, Sad  Cognition: Oriented, Alert  Evidence of Imminent Suicide Risk: No  Evidence of imminent homicide risk: No  Therapeutic Interventions: Emotion clarification, Cognitive clarification  Progress Toward Treatment Goal: Moderate improvement

## 2019-01-15 NOTE — BH THERAPY
Group Therapy Checklist  Attendance: Attended  Attendance Duration (min):  (60)  Number of Participants: 11  Program/Group: Intensive Outpatient Program  Topics Covered: Forgiveness  Participation: Limited verbal participation, Attentive  Affect/Mood Range: Normal range, Flexible  Affect/Mood Display: Congruent w/content  Cognition: Alert, Oriented  Evidence of Imminent Suicide Risk: No  Evidence of imminent homicide risk: No  Therapeutic Interventions: Emotion clarification, Values clarification  Progress Toward Treatment Goal: Moderate improvement

## 2019-01-17 ENCOUNTER — HOSPITAL ENCOUNTER (OUTPATIENT)
Dept: BEHAVIORAL HEALTH | Facility: MEDICAL CENTER | Age: 54
End: 2019-01-17
Attending: PSYCHIATRY & NEUROLOGY
Payer: COMMERCIAL

## 2019-01-17 ENCOUNTER — TELEPHONE (OUTPATIENT)
Dept: BEHAVIORAL HEALTH | Facility: MEDICAL CENTER | Age: 54
End: 2019-01-17

## 2019-01-17 DIAGNOSIS — F41.9 ANXIETY: ICD-10-CM

## 2019-01-17 PROCEDURE — 90853 GROUP PSYCHOTHERAPY: CPT

## 2019-01-17 NOTE — ADDENDUM NOTE
Encounter addended by: Agnes Solorzano R.N. on: 1/17/2019  3:40 PM<BR>    Actions taken: Care Plan problems brought forward, Care Plan modified, Care Plan progress modified, Sign clinical note, Administrative Information edited

## 2019-01-17 NOTE — CARE PLAN
Problem: Mood Instability Interfering with Adl’S  Goal: Stable mood and functioning    Intervention: Anxiety Management   [facility]  Therapy Progress Note    Patient Name: Shama Paula  Patient MRN: [id number]  Today's Date: 1/17/2019     Type of session:Individual psychotherapy  Length of session: 60 minutes  Persons in attendance:Patient    Subjective/New Info: Shama presents for individual session, calling and asking for earlier appointment. She arrives crying and shaken. Shama describes feeling overwhelmed with thoughts of being followed and feeling that she is being watched. She cited examples from the past year. She received a threatening phone message which she played for me. Shama pieced together several inconsistencies and observations that may or may not be factual. She is distraught. We discussed her options and then explored her thought process and fear. Will discuss with MD for medication management before next week.     Objective/Observations:   Participation: Active verbal participation, Attentive, Engaged and Open to feedback   Grooming: Casual and Neat   Cognition: Alert, Fully Oriented and distressed   Eye contact: Limited   Mood: Anxious   Affect: Labile, Congruent with content and Anxious   Thought process: Tangential   Speech: Hypertalkative   Other:     Diagnoses:   1. Anxiety         Current risk:   SUICIDE: Not applicable   Homicide: Not applicable   Self-harm: Not applicable   Relapse: Not applicable   Other:    Safety Plan reviewed? Not Indicated   If evidence of imminent risk is present, intervention/plan:     Therapeutic Intervention(s): Behavior:  Behavior analysis, Distress tolerance skills, Review treatment plan, Self-care skills, Stressors assessed and Supportive psychotherapy    Treatment Goal(s)/Objective(s) addressed: anxiety management.      Progress toward Treatment Goals: Moderate decline    Plan:  - Continue Intensive Outpatient Program  - Next appointment  scheduled:  1/17/2019  - Patient is in agreement with the above plan:  YES    Agnes Solorzano R.N.  1/17/2019

## 2019-01-17 NOTE — BH THERAPY
Group Therapy Checklist  Attendance: Attended  Attendance Duration (min):  (90)  Number of Participants: 10  Program/Group: Intensive Outpatient Program  Topics Covered:  (process group)  Participation: Active verbal participation, Attentive, Supportive to other group members  Affect/Mood Range: Normal range, Flexible  Affect/Mood Display: Congruent w/content, Anxious  Cognition: Alert, Oriented  Evidence of Imminent Suicide Risk: No  Evidence of imminent homicide risk: No  Therapeutic Interventions: Emotion clarification, Supportive psychotherapy  Progress Toward Treatment Goal: No change  Patient actively participated in process group.  Addressed active unresolved emotional issues. Taught some emotional skills and techniques to assist with the emotional skill building that relates to their presenting issues and active treatment plan.

## 2019-01-18 ENCOUNTER — HOSPITAL ENCOUNTER (OUTPATIENT)
Dept: BEHAVIORAL HEALTH | Facility: MEDICAL CENTER | Age: 54
End: 2019-01-18
Attending: PSYCHIATRY & NEUROLOGY
Payer: COMMERCIAL

## 2019-01-18 VITALS
DIASTOLIC BLOOD PRESSURE: 93 MMHG | BODY MASS INDEX: 30.26 KG/M2 | WEIGHT: 199 LBS | HEART RATE: 88 BPM | SYSTOLIC BLOOD PRESSURE: 134 MMHG

## 2019-01-18 DIAGNOSIS — F41.9 ANXIETY: ICD-10-CM

## 2019-01-18 DIAGNOSIS — F51.05 INSOMNIA SECONDARY TO ANXIETY: ICD-10-CM

## 2019-01-18 DIAGNOSIS — F41.9 INSOMNIA SECONDARY TO ANXIETY: ICD-10-CM

## 2019-01-18 DIAGNOSIS — F22 PARANOIA (HCC): ICD-10-CM

## 2019-01-18 PROCEDURE — G0410 GRP PSYCH PARTIAL HOSP 45-50: HCPCS

## 2019-01-18 PROCEDURE — 99214 OFFICE O/P EST MOD 30 MIN: CPT | Performed by: PSYCHIATRY & NEUROLOGY

## 2019-01-18 PROCEDURE — 90853 GROUP PSYCHOTHERAPY: CPT

## 2019-01-18 PROCEDURE — G0176 OPPS/PHP;ACTIVITY THERAPY: HCPCS

## 2019-01-18 PROCEDURE — G0177 OPPS/PHP; TRAIN & EDUC SERV: HCPCS

## 2019-01-18 RX ORDER — ESCITALOPRAM OXALATE 10 MG/1
10 TABLET ORAL DAILY
Qty: 30 TAB | Refills: 0 | Status: SHIPPED | OUTPATIENT
Start: 2019-01-18 | End: 2019-01-29

## 2019-01-18 NOTE — PROGRESS NOTES
PSYCHIATRY FOLLOW-UP NOTE      Chief Complaint   Patient presents with   • Anxiety         History Of Present Illness:  Shama Paula is a 53 y.o. old female with hypothyroidism, anxiety disorder comes in today for follow up, was last seen 10 days ago.  She was doing good up until the last few days when she started feeling more anxious and started having problems with her thoughts.  She received a voicemail last week and she could not make much sense out of it.  2 days ago she was at work and  maverick came into their office which freaked her out.  She then saw the same maverick at [place] [facility] yesterday.  She tries to connect the dots between different encounters to find a meeting.  She has been paying a lot of attention to cars in the license plates and feels that there are cars that are tax exempt and she thinks why they are around her.  She has been feeling more anxious again when she is coming back home.  She is trying not to tell her  about anything as it causes a fight between both of them.  She took 2 extra doses of Lexapro yesterday as well as 3 extra doses of Klonopin which did calm her down.  She does feel that she has been feeling more anxious in the last few days.  She denies any side effects from either of the 2 medications.  She feels that she slept better because she took extra doses of Klonopin but her sleep has been anywhere from 2-4 hours when she does not take the Klonopin.  She denies any current depressive or other mood symptoms.    Social History:   She is  and has 2 sons and lives with her family in [place].  She is working as a / at a local 's office.    Substance Use:  Alcohol - Denies  Nicotine - Denies  Illicit drugs - Denies    Past Medication Trials:  None    Medications:  Current Outpatient Prescriptions   Medication Sig Dispense Refill   • escitalopram (LEXAPRO) 10 MG Tab Take 1 Tab by mouth every day. 30 Tab 0   • clonazePAM (KLONOPIN)  "0.5 MG Tab Take one tablet by mouth at bed time as needed for 30 days. 20 Tab 0     No current facility-administered medications for this encounter.        Review Of Systems:    Constitutional - Negative for fatigue  Respiratory - Negative for shortness of breath, cough  CVS - Negative for chest pain, palpitations  GI - Negative for nausea, vomiting, abdominal pain, diarrhea, constipation  Musculoskeletal - Negative for back pain  Neurological - Negative for headaches  Psychiatric - Positive for anxiety, paranoia, poor sleep    Physical Examination:  Vital signs: /93   Pulse 88   Wt 90.3 kg (199 lb)   LMP 12/06/2012   BMI 30.26 kg/m²     Musculoskeletal: Normal gait. No abnormal movements.     Mental Status Evaluation:   General: Middle aged white female, dressed in casual attire, good grooming and hygiene, in no apparent distress, calm and cooperative, good eye contact, no psychomotor agitation or retardation  Orientation: Alert and oriented to person, place and time  Recent and remote memory: Grossly intact  Attention span and concentration: Grossly intact  Speech: Spontaneous, normal rate, rhythm and tone  Thought Process: Linear, logical and goal directed  Thought Content: Denies suicidal or homicidal ideations, intent or plan  Perception: Denies auditory or visual hallucinations. Paranoid delusions noted  Associations: Intact  Language: Appropriate  Fund of knowledge and vocabulary: Grossly adequate  Mood: \"fine\"  Affect: Anxious, mood congruent  Insight: Partial   Judgment: Good    Depression screening:  Depression Screen (PHQ-2/PHQ-9) 3/17/2017 12/18/2018 12/24/2018   PHQ-2 Total Score - - -   PHQ-2 Total Score 0 0 2   PHQ-9 Total Score - - 8     Interpretation of PHQ-9 Total Score   Score Severity   1-4 No Depression   5-9 Mild Depression   10-14 Moderate Depression   15-19 Moderately Severe Depression   20-27 Severe Depression    Medical Records/Labs/Diagnostic Tests Reviewed:  [place] - " one prescription of Klonopin found in the last 1 year, no abuse suspected       Impression:  1.  Paranoia (secondary to anxiety vs. delusional disorder)   2.  Anxiety secondary to paranoia  3.  Insomnia secondary to anxiety    Plan:  1. Increase Lexapro to 10 mg daily for anxiety.  2. Continue Klonopin 0.5 mg daily as needed for anxiety and sleep.  I have asked her not to take more than the prescribed amount and to not drive after taking Klonopin.  3. Discussed adding a low-dose antipsychotic to help with her thoughts and paranoia.  She appeared a little less resistant than her previous appointments.  I have asked her to think about it over the weekend and I will rediscuss it with her on Tuesday.  4. Continue Intensive Outpatient Program    Return to clinic in 1 week or sooner if symptoms worsen    The proposed treatment plan was discussed with the patient who was provided the opportunity to ask questions and make suggestions regarding alternative treatment. Patient verbalized understanding and expressed agreement with the plan.     Camelia Azul M.D.  01/18/19    This note was created using voice recognition software (Dragon). The accuracy of the dictation is limited by the abilities of the software. I have reviewed the note prior to signing, however some errors in grammar and context are still possible. If you have any questions related to this note please do not hesitate to contact our office.

## 2019-01-18 NOTE — BH THERAPY
Group Therapy Checklist  Attendance: Attended  Attendance Duration (min):  (60)  Number of Participants: 12  Program/Group: Intensive Outpatient Program  Topics Covered: Med aspects [place]  Participation: Attentive  Affect/Mood Range: Normal range, Flexible  Affect/Mood Display: Congruent w/content  Cognition: Alert, Oriented  Evidence of Imminent Suicide Risk: No  Evidence of imminent homicide risk: No  Therapeutic Interventions: Cognitive clarification, Relapse prevention  Progress Toward Treatment Goal: Mild improvement

## 2019-01-18 NOTE — BH THERAPY
Group Therapy Checklist  Attendance: Attended  Attendance Duration (min):  (60)  Number of Participants: 1  Program/Group: Partial Hospital Program  Topics Covered:  (creative art)  Participation: Active verbal participation, Attentive  Affect/Mood Range: Normal range, Flexible  Affect/Mood Display: Congruent w/content  Cognition: Alert, Oriented  Evidence of Imminent Suicide Risk: No  Evidence of imminent homicide risk: No  Therapeutic Interventions: Socialization, Leisure and Recreation skills  Progress Toward Treatment Goal: Moderate improvement

## 2019-01-18 NOTE — BH THERAPY
Group Therapy Checklist  Attendance: Attended  Attendance Duration (min):  (60)  Number of Participants: 2  Program/Group: Partial Hospital Program  Topics Covered:  (Aditya Martinez)  Participation: Active verbal participation, Attentive  Affect/Mood Range: Normal range, Flexible  Affect/Mood Display: Congruent w/content  Cognition: Alert, Oriented  Evidence of Imminent Suicide Risk: No  Evidence of imminent homicide risk: No  Therapeutic Interventions: Emotion clarification, Cognitive clarification, Values clarification  Progress Toward Treatment Goal: Moderate improvement

## 2019-01-19 NOTE — BH THERAPY
Group Therapy Checklist  Attendance: Attended  Attendance Duration (min):  (90 min.)  Number of Participants: 12  Program/Group: Intensive Outpatient Program  Topics Covered: Weekend planning  Participation: Limited verbal participation, Attentive (Pt will stay at home with  and kids over the weekend an dhopes to get outside for a walk.  She was quiet in group today. )  Affect/Mood Range: Constricted  Affect/Mood Display: Congruent w/content, Sad  Cognition: Oriented, Alert  Evidence of Imminent Suicide Risk: No  Evidence of imminent homicide risk: No  Therapeutic Interventions: Cognitive clarification, Emotion clarification  Progress Toward Treatment Goal: Moderate improvement

## 2019-01-21 NOTE — ADDENDUM NOTE
Encounter addended by: Agnes Solorzano R.N. on: 1/21/2019  2:28 PM<BR>    Actions taken: Care Plan modified

## 2019-01-22 ENCOUNTER — HOSPITAL ENCOUNTER (OUTPATIENT)
Dept: BEHAVIORAL HEALTH | Facility: MEDICAL CENTER | Age: 54
End: 2019-01-22
Attending: PSYCHIATRY & NEUROLOGY
Payer: COMMERCIAL

## 2019-01-22 ENCOUNTER — TELEPHONE (OUTPATIENT)
Dept: BEHAVIORAL HEALTH | Facility: MEDICAL CENTER | Age: 54
End: 2019-01-22

## 2019-01-22 VITALS
BODY MASS INDEX: 31.17 KG/M2 | HEART RATE: 65 BPM | DIASTOLIC BLOOD PRESSURE: 90 MMHG | SYSTOLIC BLOOD PRESSURE: 136 MMHG | WEIGHT: 205 LBS

## 2019-01-22 DIAGNOSIS — Z79.899 ENCOUNTER FOR LONG-TERM (CURRENT) USE OF MEDICATIONS: ICD-10-CM

## 2019-01-22 DIAGNOSIS — F41.9 INSOMNIA SECONDARY TO ANXIETY: ICD-10-CM

## 2019-01-22 DIAGNOSIS — F41.9 ANXIETY: ICD-10-CM

## 2019-01-22 DIAGNOSIS — F51.05 INSOMNIA SECONDARY TO ANXIETY: ICD-10-CM

## 2019-01-22 DIAGNOSIS — F22 PARANOIA (HCC): ICD-10-CM

## 2019-01-22 PROCEDURE — 99214 OFFICE O/P EST MOD 30 MIN: CPT | Performed by: PSYCHIATRY & NEUROLOGY

## 2019-01-22 PROCEDURE — 90853 GROUP PSYCHOTHERAPY: CPT

## 2019-01-22 RX ORDER — RISPERIDONE 0.5 MG/1
0.5 TABLET ORAL
Qty: 30 TAB | Refills: 0 | Status: SHIPPED | OUTPATIENT
Start: 2019-01-22 | End: 2019-01-29

## 2019-01-22 NOTE — TELEPHONE ENCOUNTER
[facility]    Treatment Team Staffing    Patient Name: Shama Paula Program: IOP Date: 1/22/2019     Attendees: Candice Fleming RN, [facility][note 3]; Francesca Ramos RN, [facility][note 3]; MICHAEL Roberts, [facility][note 3]; Agnes Solorzano RN and Camelia Azul MD    Patient's Progress toward Goals Listed on the Treatment Plan: willing to take medication    1. Client's Participation When in Attendance Was: Active in a Positive Way    2. Counselor's Evaluation of Client's Progress: Positive Movement    3. Patient is attending group and individual sessions and is progressing well toward the treatment goals: yes      YES NO   A. Relapse During Program []  [x]    B. Requires physician review []  [x]    C. Referral to program inappropriate []  [x]    D. Non compliance with Treatment Plan []  [x]    E. Early treatment termination (lack of attendance) []  [x]     []  []      Comments: Shama is able to keep fears from group, recognizing there is a possibility that she is not seeing things as they really are. Fluctuates.     Treatment Plan Review: - Continue Medication management and Intensive Outpatient Program  - Next appointment scheduled:  1/24/2019  - Patient is in agreement with the above plan:  YES

## 2019-01-23 NOTE — ADDENDUM NOTE
Encounter addended by: AMRITA Tierney on: 1/22/2019  4:53 PM<BR>    Actions taken: Sign clinical note

## 2019-01-23 NOTE — BH THERAPY
Group Therapy Checklist  Attendance: Attended  Attendance Duration (min):  (90 min. )  Number of Participants: 13  Program/Group: Intensive Outpatient Program  Topics Covered:  (Group Therapy)  Participation: No verbal participation, Attentive (Pt listened only in group and chose to not share or support others.  She seemed to be in her own head today. )  Affect/Mood Range: Constricted  Affect/Mood Display: Congruent w/content, Sad  Cognition: Oriented, Alert  Evidence of Imminent Suicide Risk: No  Evidence of imminent homicide risk: No  Therapeutic Interventions: Cognitive clarification, Emotion clarification  Progress Toward Treatment Goal: No change

## 2019-01-23 NOTE — BH THERAPY
Group Therapy Checklist  Attendance: Attended  Attendance Duration (min):  (90 min.)  Number of Participants: 13  Program/Group: Intensive Outpatient Program  Topics Covered:  (Group Therapy)  Participation: No verbal participation, Guarded/resistant  Affect/Mood Range: Constricted  Affect/Mood Display: Congruent w/content, Sad  Cognition: Oriented, Alert  Evidence of Imminent Suicide Risk: No  Evidence of imminent homicide risk: No  Therapeutic Interventions: Cognitive clarification, Emotion clarification  Progress Toward Treatment Goal: Moderate improvement

## 2019-01-24 ENCOUNTER — HOSPITAL ENCOUNTER (OUTPATIENT)
Dept: BEHAVIORAL HEALTH | Facility: MEDICAL CENTER | Age: 54
End: 2019-01-24
Attending: PSYCHIATRY & NEUROLOGY
Payer: COMMERCIAL

## 2019-01-24 DIAGNOSIS — F43.10 POSTTRAUMATIC STRESS DISORDER: ICD-10-CM

## 2019-01-24 PROCEDURE — 90853 GROUP PSYCHOTHERAPY: CPT | Performed by: MARRIAGE & FAMILY THERAPIST

## 2019-01-24 NOTE — BH THERAPY
Group Therapy Checklist  Attendance: Attended  Attendance Duration (min):  (60 min.)  Number of Participants: 13  Program/Group: Intensive Outpatient Program  Topics Covered: Dual Diagnosis  Participation: Limited verbal participation, Attentive  Affect/Mood Range: Constricted  Affect/Mood Display: Congruent w/content, Sad  Cognition: Oriented, Alert  Evidence of Imminent Suicide Risk: No  Evidence of imminent homicide risk: No  Therapeutic Interventions: Psychoeducation re: (Comment), Emotion clarification  Progress Toward Treatment Goal: Moderate improvement

## 2019-01-24 NOTE — BH THERAPY
Group Therapy Checklist  Attendance: Attended  Attendance Duration (min):  (90)  Number of Participants: 13  Program/Group: Intensive Outpatient Program  Topics Covered:  (process group)  Participation: Active verbal participation, Attentive, Supportive to other group members, Open to feedback  Affect/Mood Range: Normal range, Flexible  Affect/Mood Display: Congruent w/content  Cognition: Alert, Oriented  Evidence of Imminent Suicide Risk: No  Evidence of imminent homicide risk: No  Therapeutic Interventions: Emotion clarification, Supportive psychotherapy  Progress Toward Treatment Goal: No change  Patient actively participated in process group.  Addressed active unresolved emotional issues. Taught some emotional skills and techniques to assist with the emotional skill building that relates to their presenting issues and active treatment plan.

## 2019-01-25 ENCOUNTER — HOSPITAL ENCOUNTER (OUTPATIENT)
Dept: BEHAVIORAL HEALTH | Facility: MEDICAL CENTER | Age: 54
End: 2019-01-25
Attending: PSYCHIATRY & NEUROLOGY
Payer: COMMERCIAL

## 2019-01-25 ENCOUNTER — HOSPITAL ENCOUNTER (OUTPATIENT)
Dept: LAB | Facility: MEDICAL CENTER | Age: 54
End: 2019-01-25
Attending: PSYCHIATRY & NEUROLOGY
Payer: COMMERCIAL

## 2019-01-25 DIAGNOSIS — F41.9 ANXIETY: ICD-10-CM

## 2019-01-25 DIAGNOSIS — Z79.899 ENCOUNTER FOR LONG-TERM (CURRENT) USE OF MEDICATIONS: ICD-10-CM

## 2019-01-25 LAB
CHOLEST SERPL-MCNC: 140 MG/DL (ref 100–199)
EST. AVERAGE GLUCOSE BLD GHB EST-MCNC: 120 MG/DL
FASTING STATUS PATIENT QL REPORTED: NORMAL
HBA1C MFR BLD: 5.8 % (ref 0–5.6)
HDLC SERPL-MCNC: 53 MG/DL
LDLC SERPL CALC-MCNC: 70 MG/DL
TRIGL SERPL-MCNC: 86 MG/DL (ref 0–149)

## 2019-01-25 PROCEDURE — 90834 PSYTX W PT 45 MINUTES: CPT

## 2019-01-25 PROCEDURE — 83036 HEMOGLOBIN GLYCOSYLATED A1C: CPT

## 2019-01-25 PROCEDURE — 80061 LIPID PANEL: CPT

## 2019-01-25 PROCEDURE — 90853 GROUP PSYCHOTHERAPY: CPT

## 2019-01-25 PROCEDURE — 36415 COLL VENOUS BLD VENIPUNCTURE: CPT

## 2019-01-25 NOTE — CARE PLAN
Problem: Mood Instability Interfering with Adl’S  Goal: Stable mood and functioning    Intervention: Intensive Outpatient Program   [facility]  Therapy Progress Note    Patient Name: Shama Paula  Patient MRN: [id number]  Today's Date: 1/25/2019     Type of session:Individual psychotherapy  Length of session: 45 minutes  Persons in attendance:Patient    Subjective/New Info: individual session. Shama picked up her medication but she has not started taking it yet, she wanted to wait until the weekend. Shama processed making a police report on her suspicions and the skepticism she met with her ; her take is that he was upset that she didn't take him with her. He will attend her next appointment with the psychiatrist with her to be updated and included in her care. Redirected our session to focus on the exhaustion from keeping this process going, connecting the dots of all the data and her perceptions with only three hours of sleep each night. Encouraged to take medications for some relief.     Objective/Observations:   Participation: Active verbal participation, Attentive, Engaged and Guarded   Grooming: Casual and Neat   Cognition: Alert and Fully Oriented   Eye contact: Limited   Mood: Anxious   Affect: Flexible, Congruent with content and Anxious   Thought process: Logical and Goal-directed   Speech: Rate within normal limits and Volume within normal limits   Other:     Diagnoses: anxiety, thought disorder     Current risk:   SUICIDE: Not applicable   Homicide: Not applicable   Self-harm: Not applicable   Relapse: Not applicable   Other:    Safety Plan reviewed? Not Indicated   If evidence of imminent risk is present, intervention/plan:     Therapeutic Intervention(s): Clarify:  Clarify thoughts, Distress tolerance skills, Parenting/familial roles addressed, Review treatment plan, Self-care skills and Supportive psychotherapy    Treatment Goal(s)/Objective(s) addressed: thought process, anxiety,  "treatment goals, recovery planning     Progress toward Treatment Goals: Return to baseline    Plan:  - Continue Intensive Outpatient Program  - \"Homework\" recommendation: goals and plans  - Next appointment scheduled:  1/29/2019  - Patient is in agreement with the above plan:  YES    Agnes Solorzano R.N.  1/25/2019                                         "

## 2019-01-25 NOTE — BH THERAPY
Group Therapy Checklist  Attendance: Attended  Attendance Duration (min):  (90 min.)  Number of Participants: 17  Program/Group: Intensive Outpatient Program  Topics Covered: Weekend planning  Participation: Limited verbal participation, Attentive (Pt will spend time with her family and try to get outside for  a walk with warmer temps this weekend. )  Affect/Mood Range: Flexible  Affect/Mood Display: Congruent w/content  Cognition: Oriented, Alert  Evidence of Imminent Suicide Risk: No  Evidence of imminent homicide risk: No  Therapeutic Interventions: Cognitive clarification, Emotion clarification  Progress Toward Treatment Goal: Moderate improvement

## 2019-01-25 NOTE — BH THERAPY
Group Therapy Checklist  Attendance: Attended  Attendance Duration (min):  (60)  Number of Participants: 17  Program/Group: Intensive Outpatient Program  Topics Covered: Boundaries  Participation: Active verbal participation, Attentive  Affect/Mood Range: Normal range, Flexible  Affect/Mood Display: Congruent w/content  Cognition: Alert, Oriented  Evidence of Imminent Suicide Risk: No  Evidence of imminent homicide risk: No  Therapeutic Interventions: Cognitive clarification, Emotion clarification  Progress Toward Treatment Goal: Mild improvement

## 2019-01-29 ENCOUNTER — HOSPITAL ENCOUNTER (OUTPATIENT)
Dept: BEHAVIORAL HEALTH | Facility: MEDICAL CENTER | Age: 54
End: 2019-01-29
Attending: PSYCHIATRY & NEUROLOGY
Payer: COMMERCIAL

## 2019-01-29 VITALS
DIASTOLIC BLOOD PRESSURE: 95 MMHG | HEART RATE: 82 BPM | WEIGHT: 200 LBS | BODY MASS INDEX: 30.41 KG/M2 | SYSTOLIC BLOOD PRESSURE: 165 MMHG

## 2019-01-29 DIAGNOSIS — F22 PARANOIA (HCC): ICD-10-CM

## 2019-01-29 DIAGNOSIS — F41.9 ANXIETY: ICD-10-CM

## 2019-01-29 DIAGNOSIS — F51.05 INSOMNIA SECONDARY TO ANXIETY: ICD-10-CM

## 2019-01-29 DIAGNOSIS — F41.9 INSOMNIA SECONDARY TO ANXIETY: ICD-10-CM

## 2019-01-29 PROCEDURE — 90853 GROUP PSYCHOTHERAPY: CPT

## 2019-01-29 PROCEDURE — 99214 OFFICE O/P EST MOD 30 MIN: CPT | Performed by: PSYCHIATRY & NEUROLOGY

## 2019-01-29 PROCEDURE — 99211 OFF/OP EST MAY X REQ PHY/QHP: CPT

## 2019-01-29 PROCEDURE — 90833 PSYTX W PT W E/M 30 MIN: CPT | Performed by: PSYCHIATRY & NEUROLOGY

## 2019-01-29 RX ORDER — CLONAZEPAM 0.5 MG/1
0.5 TABLET ORAL NIGHTLY PRN
Qty: 30 TAB | Refills: 0 | Status: SHIPPED | OUTPATIENT
Start: 2019-01-29 | End: 2019-01-31 | Stop reason: SDUPTHER

## 2019-01-29 RX ORDER — ESCITALOPRAM OXALATE 10 MG/1
10 TABLET ORAL DAILY
Qty: 30 TAB | Refills: 1 | Status: SHIPPED | OUTPATIENT
Start: 2019-01-29 | End: 2019-01-31 | Stop reason: SDUPTHER

## 2019-01-29 RX ORDER — RISPERIDONE 1 MG/1
1 TABLET ORAL
Qty: 30 TAB | Refills: 1 | Status: SHIPPED | OUTPATIENT
Start: 2019-01-29 | End: 2019-01-31 | Stop reason: SDUPTHER

## 2019-01-29 NOTE — BH THERAPY
Group Therapy Checklist  Attendance: Attended  Attendance Duration (min):  (60)  Number of Participants: 11  Program/Group: Intensive Outpatient Program  Topics Covered: Grief & Loss  Participation: Active verbal participation, Attentive  Affect/Mood Range: Normal range, Flexible  Affect/Mood Display: Congruent w/content  Cognition: Alert, Oriented  Evidence of Imminent Suicide Risk: No  Evidence of imminent homicide risk: No  Therapeutic Interventions: Emotion clarification, Cognitive clarification  Progress Toward Treatment Goal: Mild improvement

## 2019-01-29 NOTE — BH THERAPY
Group Therapy Checklist  Attendance: Attended  Attendance Duration (min): 0-15 (9o min.)  Number of Participants: 11  Program/Group: Intensive Outpatient Program  Topics Covered:  (Group Therapy)  Participation:  (Pt processed the concept of self actualization and was supportive to peers in group. )  Affect/Mood Range: Constricted  Affect/Mood Display: Congruent w/content  Cognition: Oriented, Alert  Evidence of Imminent Suicide Risk: No  Evidence of imminent homicide risk: No  Therapeutic Interventions: Cognitive clarification, Emotion clarification  Progress Toward Treatment Goal: Moderate improvement

## 2019-01-29 NOTE — PROGRESS NOTES
PSYCHIATRY FOLLOW-UP NOTE      Chief Complaint   Patient presents with   • Follow-Up     anxiety, paranoia         History Of Present Illness:  Shama Paula is a 53 y.o. old female with hypothyroidism, anxiety, paranoia comes in today for follow up, was last seen 1 week ago.  She was here with her .  She started taking risperidone about 4 days ago and feels less anxious than before.  She has slept better few days with the new medication.  She did not talk much about her fears and paranoia probably because her  was in the appointment.  She did talk about that she is going in the right direction and would like to continue with medications and psychotherapy at this time.  Her  is also noticed some improvement since she started the program.  She is still having anxiety and likes to be in control of things.  She did work a few days last week and denies any challenges that she had at work.  She still continues to feel less anxious when she is at home and does not have to travel.  She denies any side effects of her that she has noticed with Risperdal.  She has not taken Klonopin for her over a week now.  She does notice that she has struggled with control issues for most of her life.  She tends to get anxious when things are not going the way they are supposed to be.  She denies any current mood symptoms.  She denies having thoughts of wanting to hurt herself or others.    Social History:   She is  and has 2 sons and lives with her family in [place].  She is working as a / at a local 's office.    Substance Use:  Alcohol - Denies  Nicotine - Denies  Illicit drugs - Denies    Past Medication Trials:  None    Medications:  Current Outpatient Prescriptions   Medication Sig Dispense Refill   • risperiDONE (RISPERDAL) 1 MG Tab Take 1 Tab by mouth every bedtime. 30 Tab 1   • escitalopram (LEXAPRO) 10 MG Tab Take 1 Tab by mouth every day. 30 Tab 1   • clonazePAM  "(KLONOPIN) 0.5 MG Tab Take 1 Tab by mouth at bedtime as needed (anxiety, insomnia) for up to 30 days. 30 Tab 0     No current facility-administered medications for this encounter.        Review Of Systems:    Constitutional - Negative for fatigue  Respiratory - Negative for shortness of breath, cough  CVS - Negative for chest pain, palpitations  GI - Negative for nausea, vomiting, abdominal pain, diarrhea, constipation  Musculoskeletal - Negative for back pain  Neurological - Negative for headaches  Psychiatric - Positive for anxiety, depression, paranoia, poor sleep    Physical Examination:  Vital signs: BP (!) 165/95   Pulse 82   Wt 90.7 kg (200 lb)   LMP 12/06/2012   BMI 30.41 kg/m²     Musculoskeletal: Normal gait. No abnormal movements.     Mental Status Evaluation:   General: Middle aged white female, dressed in casual attire, good grooming and hygiene, in no apparent distress, calm and cooperative, good eye contact, no psychomotor agitation or retardation  Orientation: Alert and oriented to person, place and time  Recent and remote memory: Grossly intact  Attention span and concentration: Grossly intact  Speech: Spontaneous, normal rate, rhythm and tone  Thought Process: Linear, logical and goal directed  Thought Content: Denies suicidal or homicidal ideations, intent or plan  Perception: Denies auditory or visual hallucinations. No delusions noted  Associations: Intact  Language: Appropriate  Fund of knowledge and vocabulary: Grossly adequate  Mood: \"fine\"  Affect: Anxious, mood congruent  Insight: Partial   Judgment: Good    Depression screening:  Depression Screen (PHQ-2/PHQ-9) 3/17/2017 12/18/2018 12/24/2018   PHQ-2 Total Score - - -   PHQ-2 Total Score 0 0 2   PHQ-9 Total Score - - 8     Interpretation of PHQ-9 Total Score   Score Severity   1-4 No Depression   5-9 Mild Depression   10-14 Moderate Depression   15-19 Moderately Severe Depression   20-27 Severe Depression    Medical " Records/Labs/Diagnostic Tests Reviewed:  [place]  records - one prescription of Klonopin found in the last 1 year, no abuse suspected       Impression:  1.  Paranoia (likely delusional disorder)   2.  Anxiety secondary to paranoia  3.  Insomnia secondary to anxiety and paranoia     Plan:  1.  Continue Risperdal 0.5 mg at bedtime for 1 week and then increase to 1 mg at bedtime for paranoia   - AIMS 0 today    - Baseline metabolic monitoring (1/2019): Lipid panel normal, A1c elevated at 5.8   - Discussed healthy eating and adding more physically active   - 3-month metabolic monitoring due in 4/2019   - If her A1c worsens will switch her to another antipsychotic with better side effect profile  2.  Continue Lexapro 10 mg daily for anxiety  3.  Continue Klonopin 0.5 mg at bedtime as needed for anxiety and/or sleep  4.  Continue intensive outpatient program  5.  Provided supportive psychotherapy and psycho education (> 16 minutes).  Discussed paranoia and anxiety and educated  about the thoughts that she has been experiencing and why is it hard for him to rationalize her thinking.    Return to clinic in 4 weeks or sooner if symptoms worsen    The proposed treatment plan was discussed with the patient who was provided the opportunity to ask questions and make suggestions regarding alternative treatment. Patient verbalized understanding and expressed agreement with the plan.     Camelia Azul M.D.  01/22/19    This note was created using voice recognition software (Dragon). The accuracy of the dictation is limited by the abilities of the software. I have reviewed the note prior to signing, however some errors in grammar and context are still possible. If you have any questions related to this note please do not hesitate to contact our office.

## 2019-01-31 ENCOUNTER — HOSPITAL ENCOUNTER (OUTPATIENT)
Dept: BEHAVIORAL HEALTH | Facility: MEDICAL CENTER | Age: 54
End: 2019-01-31
Attending: PSYCHIATRY & NEUROLOGY
Payer: COMMERCIAL

## 2019-01-31 DIAGNOSIS — F43.10 POSTTRAUMATIC STRESS DISORDER: ICD-10-CM

## 2019-01-31 DIAGNOSIS — F51.05 INSOMNIA SECONDARY TO ANXIETY: ICD-10-CM

## 2019-01-31 DIAGNOSIS — F41.9 INSOMNIA SECONDARY TO ANXIETY: ICD-10-CM

## 2019-01-31 PROCEDURE — 90853 GROUP PSYCHOTHERAPY: CPT | Performed by: MARRIAGE & FAMILY THERAPIST

## 2019-01-31 RX ORDER — CLONAZEPAM 0.5 MG/1
0.5 TABLET ORAL NIGHTLY PRN
Qty: 30 TAB | Refills: 0 | Status: SHIPPED
Start: 2019-01-31 | End: 2019-02-04 | Stop reason: SDUPTHER

## 2019-01-31 RX ORDER — ESCITALOPRAM OXALATE 10 MG/1
10 TABLET ORAL DAILY
Qty: 30 TAB | Refills: 1 | Status: SHIPPED | OUTPATIENT
Start: 2019-01-31 | End: 2019-02-04 | Stop reason: SDUPTHER

## 2019-01-31 RX ORDER — RISPERIDONE 1 MG/1
1 TABLET ORAL
Qty: 30 TAB | Refills: 1 | Status: SHIPPED | OUTPATIENT
Start: 2019-01-31 | End: 2019-02-04 | Stop reason: SDUPTHER

## 2019-01-31 NOTE — BH THERAPY
Group Therapy Checklist  Attendance: Attended  Attendance Duration (min):  (60 min.)  Number of Participants: 13  Program/Group: Intensive Outpatient Program  Topics Covered: Spirituality  Participation: Active verbal participation, Attentive  Affect/Mood Range: Constricted  Affect/Mood Display: Congruent w/content  Cognition: Oriented, Alert  Evidence of Imminent Suicide Risk: No  Evidence of imminent homicide risk: No  Therapeutic Interventions: Psychoeducation re: (Comment), Emotion clarification  Progress Toward Treatment Goal: Moderate improvement

## 2019-02-01 ENCOUNTER — TELEPHONE (OUTPATIENT)
Dept: BEHAVIORAL HEALTH | Facility: MEDICAL CENTER | Age: 54
End: 2019-02-01

## 2019-02-01 ENCOUNTER — HOSPITAL ENCOUNTER (OUTPATIENT)
Dept: BEHAVIORAL HEALTH | Facility: MEDICAL CENTER | Age: 54
End: 2019-02-01
Attending: PSYCHIATRY & NEUROLOGY
Payer: COMMERCIAL

## 2019-02-01 DIAGNOSIS — F43.10 POSTTRAUMATIC STRESS DISORDER: ICD-10-CM

## 2019-02-01 DIAGNOSIS — F41.9 ANXIETY: ICD-10-CM

## 2019-02-01 PROCEDURE — 90853 GROUP PSYCHOTHERAPY: CPT | Performed by: MARRIAGE & FAMILY THERAPIST

## 2019-02-01 PROCEDURE — 90834 PSYTX W PT 45 MINUTES: CPT

## 2019-02-01 NOTE — BH THERAPY
Group Therapy Checklist  Attendance: Attended  Attendance Duration (min): 0-15 (90 min.)  Number of Participants: 11  Program/Group: Intensive Outpatient Program  Topics Covered:  (Group Therapy)  Participation: Active verbal participation, Attentive, Supportive to other group members (Pt processed her 5 weeks in program and her awareness that she is doing better with her thoughts and paranoia.   She was grateful that her  got her into this program and she feels better now and smiled often.)  Affect/Mood Range: Flexible  Affect/Mood Display: Congruent w/content  Cognition: Oriented, Alert  Evidence of Imminent Suicide Risk: No  Evidence of imminent homicide risk: No  Therapeutic Interventions: Cognitive clarification, Emotion clarification  Progress Toward Treatment Goal: Moderate improvement

## 2019-02-01 NOTE — TELEPHONE ENCOUNTER
[facility]  TRANSFER/DISCHARGE SUMMARY FORM    HHPI / SCP: yes Other Ins.: no     Patient Name: Shama Paula  Admission Date: 1/15/19  Level of Care Attended:  Intens.OP : 1965  Transfer/Discharge Date: MRN: [id number]  19       SIGNIFICANT FINDINGS/CLINICAL IMPRESSION:   DSM Codes:   IOP    ICD10 Codes:  F41.1    Additional problems identified via assessment: communication skills    Treatment Components in Which Patient Participated (check all that apply):  Education group(s), 1:1 teaching/therapy, Family education, Marital/Family Therapy, Medication Management and Group Therapy    Summary of Course of Treatment: Active participation all education forums, process groups and individual counseling sessions.     Condition at Time of Transfer/Discharge: Met treatment goals.    [x] Medications Reviewed with Copy to Patient    Referred to: Refer to [facility]: Outpatient Therapy and Outpatient Medication Management     Patient is in agreement with discharge plan: yes    Agnes Solorzano R.N.

## 2019-02-01 NOTE — BH THERAPY
Group Therapy Checklist  Attendance: Attended  Attendance Duration (min):  (60)  Number of Participants: 12  Program/Group: Intensive Outpatient Program  Topics Covered: ACT conept intro  Participation: Active verbal participation  Affect/Mood Range: Normal range, Flexible  Affect/Mood Display: Congruent w/content  Cognition: Alert, Oriented  Evidence of Imminent Suicide Risk: No  Evidence of imminent homicide risk: No  Therapeutic Interventions: Values clarification, Cognitive clarification, Mindfulness exercise  Progress Toward Treatment Goal: Moderate improvement

## 2019-02-01 NOTE — CARE PLAN
Problem: Mood Instability Interfering with Adl’S  Goal: Stable mood and functioning    Intervention: Personalized Recovery Plan   [facility]  Therapy Progress Note    Patient Name: Shama Paula  Patient MRN: [id number]  Today's Date: 2/1/2019     Type of session:Individual psychotherapy  Length of session: 45 minutes  Persons in attendance:Patient    Subjective/New Info: individual session. Shama processed her recovery plan which includes individual therapy (appointments made). Discusses Kristen's group which she will consider when the days are longer. She is keeping her job, where she feels secure and the hours are flexible She looked at walking and reading for leisure activities. She is enjoying her family more but has concerns that her son is driving (aage 16); processed how she can communicate her concerns with him without sounding an alarm. She and  continue communication and she finds the assertiveness skills are valuable. Shama will continue with psychiatrist; appt made.     Objective/Observations:   Participation: Active verbal participation, Attentive, Engaged and Open to feedback   Grooming: Casual and Neat   Cognition: Alert and Fully Oriented   Eye contact: Good   Mood: Anxious   Affect: Flexible, Full range, Congruent with content and Anxious   Thought process: Logical and Goal-directed   Speech: Rate within normal limits and Volume within normal limits   Other:     Diagnoses: paranoia    Current risk:   SUICIDE: Not applicable   Homicide: Not applicable   Self-harm: Not applicable   Relapse: Not applicable   Other:    Safety Plan reviewed? Not Indicated   If evidence of imminent risk is present, intervention/plan:     Therapeutic Intervention(s): Behavior:  Behavioral plan developed/modified, Communication skills, Distress tolerance skills, Leisure and recreation skills, Parenting/familial roles addressed, Review treatment plan, Role-playing, Self-care skills, Stressors assessed and  Supportive psychotherapy    Treatment Goal(s)/Objective(s) addressed: personalized recovery plan     Progress toward Treatment Goals: Moderate improvement    Plan:  - Termination of IOP  - Patient is in agreement with the above plan:  YES    Agnes Solorzano R.N.  2/1/2019

## 2019-02-04 DIAGNOSIS — F51.05 INSOMNIA SECONDARY TO ANXIETY: ICD-10-CM

## 2019-02-04 DIAGNOSIS — F41.9 INSOMNIA SECONDARY TO ANXIETY: ICD-10-CM

## 2019-02-04 RX ORDER — CLONAZEPAM 0.5 MG/1
0.5 TABLET ORAL NIGHTLY PRN
Qty: 30 TAB | Refills: 0 | Status: SHIPPED | OUTPATIENT
Start: 2019-02-04 | End: 2019-02-04 | Stop reason: SDUPTHER

## 2019-02-04 RX ORDER — ESCITALOPRAM OXALATE 10 MG/1
10 TABLET ORAL DAILY
Qty: 30 TAB | Refills: 1 | Status: SHIPPED | OUTPATIENT
Start: 2019-02-04 | End: 2019-02-15 | Stop reason: SDUPTHER

## 2019-02-04 RX ORDER — RISPERIDONE 1 MG/1
1 TABLET ORAL
Qty: 30 TAB | Refills: 1 | Status: SHIPPED | OUTPATIENT
Start: 2019-02-04 | End: 2019-02-15 | Stop reason: SDUPTHER

## 2019-02-04 RX ORDER — CLONAZEPAM 0.5 MG/1
0.5 TABLET ORAL NIGHTLY PRN
Qty: 30 TAB | Refills: 0 | Status: SHIPPED
Start: 2019-02-04 | End: 2019-02-15 | Stop reason: SDUPTHER

## 2019-02-14 ENCOUNTER — OFFICE VISIT (OUTPATIENT)
Dept: BEHAVIORAL HEALTH | Facility: CLINIC | Age: 54
End: 2019-02-14
Payer: COMMERCIAL

## 2019-02-14 DIAGNOSIS — F41.9 ANXIETY: ICD-10-CM

## 2019-02-14 DIAGNOSIS — F22 PARANOIA (HCC): ICD-10-CM

## 2019-02-14 PROCEDURE — 90834 PSYTX W PT 45 MINUTES: CPT | Performed by: PSYCHOLOGIST

## 2019-02-14 NOTE — BH THERAPY
[facility]  Therapy Progress Note    Patient Name: Shama Paula  Patient MRN: [id number]  Today's Date: 2/14/2019     Type of session:Individual psychotherapy  Length of session: 45 minutes  Persons in attendance:Patient    Subjective/New Info:   Patient is a 53 year old white female who was referred by her PCP for paranoia, anxiety and a first panic attack on 12/3/18 that took her to the ED where she refused any medication to help calm her down. Patient was able to clearly identify a precipitating event at her job at [employer] that occurred on 10/24/18 where she witnessed and feared for her bosses life and her own due to threats that were coming to her boss. Since this event the patient has been experiencing hypervigilance, constant fear and anxiety, increased sleep disturbance, increased startle response, paranoia that people are following her from work and that they will hurt her, her family or her friends if she is out with them, distorted cognitions linking events together as she tries to make sense of her fear response, problems concentrating, not feeling any happiness or ynes, and avoidance of this place of work as she did not return to work. Since this event on 10/24/18 the pt has become agoraphobic and experienced a major panic attack. Additionally, there may be some underlying bereavement as her father passed away in February 2018 unexpectedly. Patient presented as very paranoid. She has a very complex system of believing that she his being followed, phone tapped, and computer hacked. She demonstrated significant delusional ideations, that are fixed and paranoid.    Patient did not present in acute distress. Patient was appropriately groomed and cooperative. Patient was alert and oriented to person, place, and time. Eye contact was appropriate. No abnormalities in attention or concentration were noted. No abnormalities of movement present; psychomotor activity was normal. Speech  was fluent and regular in rhythm, rate, volume, and tone. Thought processes were linear, logical, and goal-directed. There was no evidence of thought disorder. No auditory or visual hallucinations. Long and short term memory appeared to be intact. Insight, judgment, and impulse control were deemed to be within normal limits. Reported mood was fairly positive. Affect was appropriate and congruent with thought content and conversation. Patient denied current suicidal and homicidal ideation in plan, intent, and preparatory behavior.    Objective/Observations:   Participation: Active verbal participation, Attentive, Engaged and Open to feedback   Grooming: Casual and Neat   Cognition: Alert   Eye contact: Good   Mood: paranoid and anxious   Affect: Flexible   Thought process: Goal-directed   Speech: Rate within normal limits and Volume within normal limits   Other:     Diagnoses:   1. Paranoia (HCC)    2. Anxiety       Psychometric Test Results:     BHM-20  Global Mental Health Severe Distress  Well Being Scale  Severe Distress  Symptoms Scale   Moderate Distress  Anxiety Subscale  Severe Distress  Depression Subscale  Severe Distress  Alcohol/Drug Subscale Within Normal Limits  Bipolar Subscale  Mild Distress  Eating Disorder Subscale Mild Distress  Harm to other Subscale Within Normal Limits  Suicide Monitoring Scale No Indication of Risk  Life Functioning Scale Severe Distress      Current risk:   SUICIDE: Low   Homicide: Low   Self-harm: Low   Relapse: Not applicable   Other:    Safety Plan reviewed? Not Indicated   If evidence of imminent risk is present, intervention/plan:     Therapeutic Intervention(s): Cognitive modification, Develop/modify treatment plan, Stressors assessed and Supportive psychotherapy    Treatment Goal(s)/Objective(s) addressed:   Reduce Depressive symptoms:  Objective A: Patient will increase activity level by will participating in at least two hours, three times per week in a social or  leisure activity with family member or close friend.  Objective B: Patient will express an increase in positive statements about self by will making at least five positive statements per day about self or circumstances.  Objective C: Patient will spend more time with peers/colleagues in social situations by spending at least thirty minutes four times per week in a social situation, interacting appropriately with a friend.     Reduce Symptoms of Anxiety:  Objective A: Patient will learn one effective technique for dealing with anxiety each week, and utilize it effectively when  feeling anxious.  Objective B: Patient will express an increase in positive statements by making at least five positive statements per day about self or current circumstances.  Objective C: Patient will increase activity level by participating in at least two hours, three times per week in a leisure or social activity.     Progress toward Treatment Goals: No change    Plan:  - Continue Individual therapy    João Venegas, Ph.D.  2/14/2019

## 2019-02-15 ENCOUNTER — OFFICE VISIT (OUTPATIENT)
Dept: BEHAVIORAL HEALTH | Facility: CLINIC | Age: 54
End: 2019-02-15
Payer: COMMERCIAL

## 2019-02-15 VITALS
HEIGHT: 68 IN | DIASTOLIC BLOOD PRESSURE: 92 MMHG | BODY MASS INDEX: 30.31 KG/M2 | SYSTOLIC BLOOD PRESSURE: 142 MMHG | HEART RATE: 72 BPM | WEIGHT: 200 LBS

## 2019-02-15 DIAGNOSIS — F41.9 ANXIETY: ICD-10-CM

## 2019-02-15 DIAGNOSIS — F22 PARANOIA (HCC): ICD-10-CM

## 2019-02-15 DIAGNOSIS — F51.05 INSOMNIA SECONDARY TO ANXIETY: ICD-10-CM

## 2019-02-15 DIAGNOSIS — F41.9 INSOMNIA SECONDARY TO ANXIETY: ICD-10-CM

## 2019-02-15 PROCEDURE — 90833 PSYTX W PT W E/M 30 MIN: CPT | Performed by: PSYCHIATRY & NEUROLOGY

## 2019-02-15 PROCEDURE — 99214 OFFICE O/P EST MOD 30 MIN: CPT | Performed by: PSYCHIATRY & NEUROLOGY

## 2019-02-15 RX ORDER — RISPERIDONE 1 MG/1
1 TABLET ORAL
Qty: 180 TAB | Refills: 0 | Status: SHIPPED | OUTPATIENT
Start: 2019-02-15 | End: 2019-07-31 | Stop reason: SDUPTHER

## 2019-02-15 RX ORDER — CLONAZEPAM 0.5 MG/1
0.5 TABLET ORAL NIGHTLY PRN
Qty: 30 TAB | Refills: 0 | Status: SHIPPED | OUTPATIENT
Start: 2019-02-15 | End: 2019-03-17

## 2019-02-15 RX ORDER — ESCITALOPRAM OXALATE 10 MG/1
10 TABLET ORAL DAILY
Qty: 180 TAB | Refills: 0 | Status: SHIPPED | OUTPATIENT
Start: 2019-02-15 | End: 2019-07-31 | Stop reason: SDUPTHER

## 2019-02-15 NOTE — PROGRESS NOTES
PSYCHIATRY FOLLOW-UP NOTE      Chief Complaint   Patient presents with   • Follow-Up     paranoia, anxiety         History Of Present Illness:  Shama Paula is a 53 y.o. old female with hypothyroidism, anxiety, paranoia comes in today for follow up, was last seen 2 weeks ago.  She was noted to be more paranoid and anxious compared to when she was in the program.  She continues to feel that she and her family are being harassed.  She has been getting anxious about people around school who have no business to be there although she does not know if they have anything to do with the school or not.  She is thinking about instilling Was in her car to watch if anybody is harassing her son but does not think that her  will agree to it.  She took a week of work as it was her father's death anniversary last week.  She feels a lot less anxious when she is at home and nothing bothers her.  She continues to stay off electronics as much as she can as she feels that her information is being used somewhere else.  Her medications were incorrectly sent to a different pharmacy and I apologize for the same.  However, she did not feel comfortable picking up her medications after this as she felt that the pharmacy could have done something to the medications.  She has been off her medications since the program.  She is agreeable to restarting her medications as long as I can continue paper prescriptions and give a 6-month supply and she is willing to pay cash for it.  She continues to use Klonopin for overwhelming anxiety and still has a few pills left from her last prescription.  She denies having thoughts of wanting to hurt herself or others.    Social History:   She is  and has 2 sons and lives with her family in [place].  She is working as a / at a local 's office.    Substance Use:  Alcohol - Denies  Nicotine - Denies  Illicit drugs - Denies    Past Medication  "Trials:  None    Medications:  Current Outpatient Prescriptions   Medication Sig Dispense Refill   • escitalopram (LEXAPRO) 10 MG Tab Take 1 Tab by mouth every day for 180 days. (Patient not taking: Reported on 2/15/2019) 180 Tab 0   • risperiDONE (RISPERDAL) 1 MG Tab Take 1 Tab by mouth every bedtime for 180 days. (Patient not taking: Reported on 2/15/2019) 180 Tab 0   • clonazePAM (KLONOPIN) 0.5 MG Tab Take 1 Tab by mouth at bedtime as needed (anxiety, insomnia) for up to 30 days. (Patient not taking: Reported on 2/15/2019) 30 Tab 0     No current facility-administered medications for this visit.        Review Of Systems:    Constitutional - Negative for fatigue  Respiratory - Negative for shortness of breath, cough  CVS - Negative for chest pain, palpitations  GI - Negative for nausea, vomiting, abdominal pain, diarrhea, constipation  Musculoskeletal - Negative for back pain  Neurological - Negative for headaches  Psychiatric - Positive for anxiety, depression, paranoia, poor sleep    Physical Examination:  Vital signs: /92   Pulse 72   Ht 1.727 m (5' 8\")   Wt 90.7 kg (200 lb)   LMP 12/06/2012   BMI 30.41 kg/m²     Musculoskeletal: Normal gait. No abnormal movements.     Mental Status Evaluation:   General: Middle aged white female, dressed in casual attire, good grooming and hygiene, in no apparent distress, calm and cooperative, good eye contact, no psychomotor agitation or retardation  Orientation: Alert and oriented to person, place and time  Recent and remote memory: Grossly intact  Attention span and concentration: Grossly intact  Speech: Spontaneous, normal rate, rhythm and tone  Thought Process: Linear, logical and goal directed  Thought Content: Denies suicidal or homicidal ideations, intent or plan  Perception: Denies auditory or visual hallucinations. No delusions noted  Associations: Intact  Language: Appropriate  Fund of knowledge and vocabulary: Grossly adequate  Mood: \"fine\"  Affect: " Anxious, mood congruent  Insight: Partial   Judgment: Good    Depression screening:  Depression Screen (PHQ-2/PHQ-9) 3/17/2017 12/18/2018 12/24/2018   PHQ-2 Total Score - - -   PHQ-2 Total Score 0 0 2   PHQ-9 Total Score - - 8     Interpretation of PHQ-9 Total Score   Score Severity   1-4 No Depression   5-9 Mild Depression   10-14 Moderate Depression   15-19 Moderately Severe Depression   20-27 Severe Depression    Medical Records/Labs/Diagnostic Tests Reviewed:  [place]  records - one prescription of Klonopin found in the last 1 year, no abuse suspected       Impression:  1.  Paranoia (likely delusional disorder) - worsening  2.  Anxiety secondary to paranoia - worsening  3.  Insomnia secondary to anxiety and paranoia - worsening    Plan:  1.  Restart Risperdal 1 mg at bedtime for paranoia   - AIMS 0 (1/2019)    - Baseline metabolic monitoring (1/2019): Lipid panel normal, A1c elevated at 5.8   - 3-month metabolic monitoring due in 4/2019  2.  Restart Lexapro 10 mg daily for anxiety  3.  Restart Klonopin 0.5 mg at bedtime as needed for anxiety and/or sleep  4.  Continue individual psychotherapy with Dr. João Venegas, Ph.D  5.  Provided supportive psychotherapy (> 16 minutes).  Challenged her paranoid thought process to try and gain some insight into her thinking.)    Return to clinic in 2 weeks or sooner if symptoms worsen    The proposed treatment plan was discussed with the patient who was provided the opportunity to ask questions and make suggestions regarding alternative treatment. Patient verbalized understanding and expressed agreement with the plan.     Camelia Azul M.D.  01/22/19    This note was created using voice recognition software (Dragon). The accuracy of the dictation is limited by the abilities of the software. I have reviewed the note prior to signing, however some errors in grammar and context are still possible. If you have any questions related to this note please do not hesitate to contact  our office.

## 2019-03-01 ENCOUNTER — OFFICE VISIT (OUTPATIENT)
Dept: BEHAVIORAL HEALTH | Facility: CLINIC | Age: 54
End: 2019-03-01
Payer: COMMERCIAL

## 2019-03-01 VITALS
HEIGHT: 68 IN | WEIGHT: 202 LBS | DIASTOLIC BLOOD PRESSURE: 88 MMHG | BODY MASS INDEX: 30.62 KG/M2 | SYSTOLIC BLOOD PRESSURE: 153 MMHG | HEART RATE: 78 BPM

## 2019-03-01 DIAGNOSIS — F41.9 INSOMNIA SECONDARY TO ANXIETY: ICD-10-CM

## 2019-03-01 DIAGNOSIS — F51.05 INSOMNIA SECONDARY TO ANXIETY: ICD-10-CM

## 2019-03-01 DIAGNOSIS — F41.9 ANXIETY: ICD-10-CM

## 2019-03-01 DIAGNOSIS — F22 PARANOIA (HCC): ICD-10-CM

## 2019-03-01 PROCEDURE — 90833 PSYTX W PT W E/M 30 MIN: CPT | Performed by: PSYCHIATRY & NEUROLOGY

## 2019-03-01 PROCEDURE — 99214 OFFICE O/P EST MOD 30 MIN: CPT | Performed by: PSYCHIATRY & NEUROLOGY

## 2019-03-01 NOTE — PROGRESS NOTES
"PSYCHIATRY FOLLOW-UP NOTE      Chief Complaint   Patient presents with   • Follow-Up     anxiety, paranoia, insomnia         History Of Present Illness:  Shama Paula is a 54 y.o. old female with hypothyroidism, anxiety, paranoia comes in today for follow up, was last seen 2 weeks ago.  She reports continued struggles with her intrusive thoughts about being followed.  She is upset that the police department decided not to pursue her complaint.  She works for a  and discuss it with him and is pursuing other options at this time.  She continues to feel that she is being followed and avoids taking the same route is going back home.  She did get security system installed at her home and does feel better about it.  She continues to avoid electronics because of concerns of privacy as much as she can.  She picked up 6-month prescriptions of Lexapro and Risperdal that she did not want \"pharmacy to mess up\".  She has been compliant with all of her medication for the last 2 months and has noticed improvements in her sleep and less frequent racing thoughts.  She is still using Klonopin on as-needed basis especially at bedtime for her sleep or anxiety.  She feels that she and her  are getting into more arguments and it takes longer and longer for them to get back to the normal self.  She denies feeling depressed but is feeling overwhelmed.  She denies having thoughts of wanting to hurt herself or others    Social History:   She is  and has 2 sons and lives with her family in [place].  She is working as a / at a local 's office.    Substance Use:  Alcohol - Denies  Nicotine - Denies  Illicit drugs - Denies    Past Medication Trials:  None    Medications:  Current Outpatient Prescriptions   Medication Sig Dispense Refill   • escitalopram (LEXAPRO) 10 MG Tab Take 1 Tab by mouth every day for 180 days. 180 Tab 0   • risperiDONE (RISPERDAL) 1 MG Tab Take 1 Tab by mouth every bedtime " "for 180 days. 180 Tab 0   • clonazePAM (KLONOPIN) 0.5 MG Tab Take 1 Tab by mouth at bedtime as needed (anxiety, insomnia) for up to 30 days. (Patient not taking: Reported on 2/15/2019) 30 Tab 0     No current facility-administered medications for this visit.        Review Of Systems:    Constitutional - Negative for fatigue  Respiratory - Negative for shortness of breath, cough  CVS - Negative for chest pain, palpitations  GI - Negative for nausea, vomiting, abdominal pain, diarrhea, constipation  Musculoskeletal - Negative for back pain  Neurological - Negative for headaches  Psychiatric - Positive for anxiety, depression, paranoia, poor sleep    Physical Examination:  Vital signs: /88   Pulse 78   Ht 1.727 m (5' 8\")   Wt 91.6 kg (202 lb)   LMP 12/06/2012   BMI 30.71 kg/m²     Musculoskeletal: Normal gait. No abnormal movements.     Mental Status Evaluation:   General: Middle aged white female, tearful, dressed in casual attire, good grooming and hygiene, in no apparent distress, calm and cooperative, good eye contact, no psychomotor agitation or retardation  Orientation: Alert and oriented to person, place and time  Recent and remote memory: Grossly intact  Attention span and concentration: Grossly intact  Speech: Spontaneous, normal rate, rhythm and tone  Thought Process: Linear, logical and goal directed  Thought Content: Denies suicidal or homicidal ideations, intent or plan  Perception: Denies auditory or visual hallucinations. No delusions noted  Associations: Intact  Language: Appropriate  Fund of knowledge and vocabulary: Grossly adequate  Mood: \"fine\"  Affect: Anxious, mood congruent  Insight: Partial   Judgment: Good    Depression screening:  Depression Screen (PHQ-2/PHQ-9) 3/17/2017 12/18/2018 12/24/2018   PHQ-2 Total Score - - -   PHQ-2 Total Score 0 0 2   PHQ-9 Total Score - - 8     Interpretation of PHQ-9 Total Score   Score Severity   1-4 No Depression   5-9 Mild Depression   10-14 " Moderate Depression   15-19 Moderately Severe Depression   20-27 Severe Depression    Medical Records/Labs/Diagnostic Tests Reviewed:  [place]  records - appropriate refills, no abuse suspected       Impression:  1.  Paranoia (likely delusional disorder) - stable  2.  Anxiety secondary to paranoia - improving  3.  Insomnia secondary to anxiety and paranoia - improving    Plan:  1.  Continue Risperdal 1 mg at bedtime for paranoia   - AIMS 0 (1/2019)    - Baseline metabolic monitoring (1/2019): Lipid panel normal, A1c elevated at 5.8   - 3-month metabolic monitoring due in 4/2019  2.  Continue Lexapro 10 mg daily for anxiety  3.  Continue Klonopin 0.5 mg at bedtime as needed for anxiety and/or sleep  4.  Continue individual psychotherapy with Dr. João Venegas, Ph.D  5.  Provided supportive psychotherapy (> 16 minutes).  Discussed her thinking and her fear of being followed.    Return to clinic in 6 weeks or sooner if symptoms worsen    The proposed treatment plan was discussed with the patient who was provided the opportunity to ask questions and make suggestions regarding alternative treatment. Patient verbalized understanding and expressed agreement with the plan.     Camelia Azul M.D.  03/01/19    This note was created using voice recognition software (Dragon). The accuracy of the dictation is limited by the abilities of the software. I have reviewed the note prior to signing, however some errors in grammar and context are still possible. If you have any questions related to this note please do not hesitate to contact our office.

## 2019-03-20 ENCOUNTER — OFFICE VISIT (OUTPATIENT)
Dept: BEHAVIORAL HEALTH | Facility: CLINIC | Age: 54
End: 2019-03-20
Payer: COMMERCIAL

## 2019-03-20 DIAGNOSIS — F22 PARANOIA (HCC): ICD-10-CM

## 2019-03-20 DIAGNOSIS — F41.9 ANXIETY: ICD-10-CM

## 2019-03-20 DIAGNOSIS — F41.9 INSOMNIA SECONDARY TO ANXIETY: ICD-10-CM

## 2019-03-20 DIAGNOSIS — F51.05 INSOMNIA SECONDARY TO ANXIETY: ICD-10-CM

## 2019-03-20 PROCEDURE — 90834 PSYTX W PT 45 MINUTES: CPT | Performed by: PSYCHOLOGIST

## 2019-03-20 NOTE — BH THERAPY
" [facility]  Therapy Progress Note    Patient Name: Shama Paula  Patient MRN: [id number]  Today's Date: 3/20/2019     Type of session:Individual psychotherapy  Length of session: 45 minutes  Persons in attendance:Patient    Subjective/New Info:   Patient is a 53 year old white female who was referred by her PCP for paranoia, anxiety and a first panic attack on 12/3/18 that took her to the ED where she refused any medication to help calm her down. Patient was able to clearly identify a precipitating event at her job at [employer] that occurred on 10/24/18 where she witnessed and feared for her boss’s life and her own due to threats that were coming to her boss. Since this event the patient has been experiencing hypervigilance, constant fear and anxiety, increased sleep disturbance, increased startle response, paranoia that people are following her from work and that they will hurt her, her family or her friends if she is out with them, distorted cognitions linking events together as she tries to make sense of her fear response, problems concentrating, not feeling any happiness or ynes, and avoidance of this place of work as she did not return to work. Patient continues to focus on her fixed \"delusion\" that the [facility] is interested in her. She has installed an alarm system in her home and has secured a concealed weapons permit and carries a weapon with her at all times. She expressed a concern that the murder of a \"mob boss\" in [place] might be related to her. She remains very fixed on the idea that since the accident that the \"mob\" has been tapping into her phone and Internet. She does not respond very well to logic, but she did appear a bit less anxious.     Patient did not present in acute distress. Patient was appropriately groomed and cooperative. Patient was alert and oriented to person, place, and time. Eye contact was appropriate. No abnormalities in attention or concentration were " noted. No abnormalities of movement present; psychomotor activity was normal. Speech was fluent and regular in rhythm, rate, volume, and tone. Thought processes were linear, logical, and goal-directed. There was no evidence of thought disorder. No auditory or visual hallucinations. Long and short term memory appeared to be intact. Insight, judgment, and impulse control were deemed to be within normal limits. Reported mood was fairly positive. Affect was appropriate and congruent with thought content and conversation. Patient denied current suicidal and homicidal ideation in plan, intent, and preparatory behavior.     Objective/Observations:              Participation: Active verbal participation, Attentive, Engaged and Open to feedback              Grooming: Casual and Neat              Cognition: Alert              Eye contact: Good              Mood: paranoid and anxious              Affect: Flexible              Thought process: Goal-directed              Speech: Rate within normal limits and Volume within normal limits              Other:     Diagnoses:   1. Paranoia (HCC)    2. Anxiety    3. Insomnia secondary to anxiety         Current risk:   SUICIDE: Low   Homicide: Low   Self-harm: Low   Relapse: Not applicable   Other:    Safety Plan reviewed? Not Indicated   If evidence of imminent risk is present, intervention/plan:     Therapeutic Intervention(s): Cognitive modification, Leisure and recreation skills, Self-care skills, Stressors assessed and Supportive psychotherapy    Treatment Goal(s)/Objective(s) addressed:   Reduce Depressive symptoms:  Objective A: Patient will increase activity level by will participating in at least two hours, three times per week in a social or leisure activity with family member or close friend.  Objective B: Patient will express an increase in positive statements about self by will making at least five positive statements per day about self or circumstances.  Objective C:  Patient will spend more time with peers/colleagues in social situations by spending at least thirty minutes four times per week in a social situation, interacting appropriately with a friend.      Reduce Symptoms of Anxiety:  Objective A: Patient will learn one effective technique for dealing with anxiety each week, and utilize it effectively when feeling anxious.  Objective B: Patient will express an increase in positive statements by making at least five positive statements per day about self or current circumstances.  Objective C: Patient will increase activity level by participating in at least two hours, three times per week in a leisure or social activity.      Progress toward Treatment Goals: No change    Plan:  - Continue Individual therapy    João Venegas, Ph.D.  3/20/2019

## 2019-04-26 ENCOUNTER — OFFICE VISIT (OUTPATIENT)
Dept: MEDICAL GROUP | Facility: MEDICAL CENTER | Age: 54
End: 2019-04-26
Payer: COMMERCIAL

## 2019-04-26 VITALS
RESPIRATION RATE: 16 BRPM | OXYGEN SATURATION: 95 % | TEMPERATURE: 97.1 F | WEIGHT: 202 LBS | DIASTOLIC BLOOD PRESSURE: 60 MMHG | SYSTOLIC BLOOD PRESSURE: 102 MMHG | HEART RATE: 67 BPM | BODY MASS INDEX: 30.62 KG/M2 | HEIGHT: 68 IN

## 2019-04-26 DIAGNOSIS — N95.2 VAGINAL ATROPHY: ICD-10-CM

## 2019-04-26 PROCEDURE — 99214 OFFICE O/P EST MOD 30 MIN: CPT | Performed by: NURSE PRACTITIONER

## 2019-04-26 RX ORDER — ESTRADIOL 0.1 MG/G
CREAM VAGINAL
Qty: 1 TUBE | Refills: 1 | Status: SHIPPED | OUTPATIENT
Start: 2019-04-26 | End: 2020-02-06 | Stop reason: SDUPTHER

## 2019-04-26 ASSESSMENT — PATIENT HEALTH QUESTIONNAIRE - PHQ9: CLINICAL INTERPRETATION OF PHQ2 SCORE: 0

## 2019-04-26 NOTE — PROGRESS NOTES
Subjective:     Chief Complaint   Patient presents with   • Medication Refill     VAGINAL ESTRIGEN CREAM      Shama Paula is a 54 y.o. female here to discuss concerns with vaginal atrophy, dryness, pain with intercourse.  This is been a chronic issue since her complete hysterectomy several years ago.  She was followed by Dr. Kennedy for a period of time, prescribed Estring and Estrace cream which did help.  She did not typically use these at the same time but would use Estrace cream until the area felt comfortable enough for insertion of the Estring.  Once she was using the Estring she would only very rarely use Estrace externally.  Due to difficulty with insurance coverage and cost she has not been using these consistently in the last several months.  She now notes an increase in vaginal irritation, pain with intercourse.  She would like to go back on medications.  No history of DVT, clotting disorder, uterine or ovarian cancer.  She is due for a mammogram.    Current medicines (including changes today)  Current Outpatient Prescriptions   Medication Sig Dispense Refill   • estradiol (ESTRACE) 0.1 MG/GM vaginal cream 2 g PV daily x 1 week then 2 g PV 1-2 times weekly 1 Tube 1   • estradiol (ESTRING) 2 MG vaginal ring 1 ring PV every 3 months 1 Each 3   • escitalopram (LEXAPRO) 10 MG Tab Take 1 Tab by mouth every day for 180 days. 180 Tab 0   • risperiDONE (RISPERDAL) 1 MG Tab Take 1 Tab by mouth every bedtime for 180 days. 180 Tab 0     No current facility-administered medications for this visit.      She  has a past medical history of Anemia (5/12/2009); Anxiety; Atrophic vaginitis (8/25/2015); Celiac disease; Celiac sprue (5/12/2009); Colon polyp (5/12/2009); Depression; Elevated TSH (12/8/2010); ENDOMETRIOSIS (5/12/2009); Heart palpitations; Ovarian cyst (5/12/2009); Pancreatic cyst (6/17/2009); Pancreatitis; Preventative health care (5/12/2009); Recurrent UTI (5/12/2009); and Tubal ligation.    ROS included  "above     Objective:     /60 (BP Location: Left arm, Patient Position: Sitting, BP Cuff Size: Adult)   Pulse 67   Temp 36.2 °C (97.1 °F) (Temporal)   Resp 16   Ht 1.727 m (5' 8\")   Wt 91.6 kg (202 lb)   SpO2 95%  Body mass index is 30.71 kg/m².     Physical Exam:  General: Alert, oriented in no acute distress.  Eye contact is good, speech is normal, affect calm  Lungs: clear to auscultation bilaterally, normal effort, no wheeze/ rhonchi/ rales.  CV: regular rate and rhythm, S1, S2, no murmur  Ext: no edema, color normal, vascularity normal, temperature normal    Assessment and Plan:   The following treatment plan was discussed   1. Vaginal atrophy   requesting restart of prior medications.  Prescriptions provided today.  Risks benefits and side effects reviewed, she will call to schedule mammogram.  estradiol (ESTRACE) 0.1 MG/GM vaginal cream    estradiol (ESTRING) 2 MG vaginal ring       Followup: as needed         Please note that this dictation was created using voice recognition software. I have worked with consultants from the vendor as well as technical experts from [facility] to optimize the interface. I have made every reasonable attempt to correct obvious errors, but I expect that there are errors of grammar and possibly content that I did not discover before finalizing the note.       "

## 2019-05-13 DIAGNOSIS — Z79.899 ENCOUNTER FOR LONG-TERM (CURRENT) USE OF MEDICATIONS: ICD-10-CM

## 2019-05-15 ENCOUNTER — OFFICE VISIT (OUTPATIENT)
Dept: BEHAVIORAL HEALTH | Facility: CLINIC | Age: 54
End: 2019-05-15
Payer: COMMERCIAL

## 2019-05-15 ENCOUNTER — HOSPITAL ENCOUNTER (OUTPATIENT)
Dept: RADIOLOGY | Facility: MEDICAL CENTER | Age: 54
End: 2019-05-15
Attending: NURSE PRACTITIONER
Payer: COMMERCIAL

## 2019-05-15 DIAGNOSIS — F41.9 ANXIETY: ICD-10-CM

## 2019-05-15 DIAGNOSIS — F22 PARANOIA (HCC): ICD-10-CM

## 2019-05-15 DIAGNOSIS — Z12.31 ENCOUNTER FOR SCREENING MAMMOGRAM FOR BREAST CANCER: ICD-10-CM

## 2019-05-15 PROCEDURE — 77063 BREAST TOMOSYNTHESIS BI: CPT

## 2019-05-15 PROCEDURE — 90834 PSYTX W PT 45 MINUTES: CPT | Performed by: PSYCHOLOGIST

## 2019-05-15 NOTE — BH THERAPY
[facility]  Therapy Progress Note    Patient Name: Shama Paula  Patient MRN: [id number]  Today's Date: 5/15/2019     Type of session:Individual psychotherapy  Length of session: 45 minutes  Persons in attendance:Patient    Subjective/New Info:   Patient is a 53 year old white female who was referred by her PCP for paranoia, anxiety and a first panic attack on 12/3/18 that took her to the ED where she refused any medication to help calm her down. Patient was able to clearly identify a precipitating event at her job at [employer] that occurred on 10/24/18 where she witnessed and feared for her boss’s life and her own due to threats that were coming to her boss. Since this event the patient has been experiencing hypervigilance, constant fear and anxiety, increased sleep disturbance, increased startle response, paranoia that people are following her from work and that they will hurt her, her family or her friends if she is out with them, distorted cognitions linking events together as she tries to make sense of her fear response, problems concentrating, not feeling any happiness or ynes, and avoidance of this place of work as she did not return to work. Although patient continues to experience her paranoid delusion, she has been less preoccupied. She has also been doing more things outside of the house and is planning some outdoor activities for the summer. Talked with patient about the importance of moving forward with the things that she enjoys, and less staying in the house.     Patient did not present in acute distress. Patient was appropriately groomed and cooperative. Patient was alert and oriented to person, place, and time. Eye contact was appropriate. No abnormalities in attention or concentration were noted. No abnormalities of movement present; psychomotor activity was normal. Speech was fluent and regular in rhythm, rate, volume, and tone. Thought processes were linear, logical, and  goal-directed. There was no evidence of thought disorder. No auditory or visual hallucinations. Long and short term memory appeared to be intact. Insight, judgment, and impulse control were deemed to be within normal limits. Reported mood was fairly positive. Affect was appropriate and congruent with thought content and conversation. Patient denied current suicidal and homicidal ideation in plan, intent, and preparatory behavior.     Objective/Observations:  Participation: Active verbal participation, Attentive, Engaged and Open to feedback  Grooming: Casual and Neat  Cognition: Alert  Eye contact: Good  Mood: paranoid and anxious  Affect: Flexible  Thought process: Goal-directed  Speech: Rate within normal limits and Volume within normal limits  Other:     Diagnoses:   1. Paranoia (HCC)    2. Anxiety         Current risk:   SUICIDE: Low   Homicide: Low   Self-harm: Low   Relapse: Not applicable   Other:    Safety Plan reviewed? Not Indicated   If evidence of imminent risk is present, intervention/plan:     Therapeutic Intervention(s): Cognitive modification, Leisure and recreation skills, Socialization, Stressors assessed and Supportive psychotherapy    Treatment Goal(s)/Objective(s) addressed:    Reduce Depressive symptoms:  Objective A: Patient will increase activity level by will participating in at least two hours, three times per week in a social or leisure activity with family member or close friend.  Objective B: Patient will express an increase in positive statements about self by will making at least five positive statements per day about self or circumstances.  Objective C: Patient will spend more time with peers/colleagues in social situations by spending at least thirty minutes four times per week in a social situation, interacting appropriately with a friend.      Reduce Symptoms of Anxiety:  Objective A: Patient will learn one effective technique for dealing with anxiety each week, and utilize it  effectively when feeling anxious.  Objective B: Patient will express an increase in positive statements by making at least five positive statements per day about self or current circumstances.  Objective C: Patient will increase activity level by participating in at least two hours, three times per week in a leisure or social activity.     Progress toward Treatment Goals: Mild improvement    Plan:  - Continue Individual therapy    João Venegas, Ph.D.  5/15/2019

## 2019-05-23 ENCOUNTER — HOSPITAL ENCOUNTER (OUTPATIENT)
Dept: LAB | Facility: MEDICAL CENTER | Age: 54
End: 2019-05-23
Attending: PSYCHIATRY & NEUROLOGY
Payer: COMMERCIAL

## 2019-05-23 DIAGNOSIS — Z79.899 ENCOUNTER FOR LONG-TERM (CURRENT) USE OF MEDICATIONS: ICD-10-CM

## 2019-05-23 LAB
CHOLEST SERPL-MCNC: 136 MG/DL (ref 100–199)
EST. AVERAGE GLUCOSE BLD GHB EST-MCNC: 123 MG/DL
FASTING STATUS PATIENT QL REPORTED: NORMAL
HBA1C MFR BLD: 5.9 % (ref 0–5.6)
HDLC SERPL-MCNC: 60 MG/DL
LDLC SERPL CALC-MCNC: 64 MG/DL
TRIGL SERPL-MCNC: 61 MG/DL (ref 0–149)

## 2019-05-23 PROCEDURE — 83036 HEMOGLOBIN GLYCOSYLATED A1C: CPT

## 2019-05-23 PROCEDURE — 80061 LIPID PANEL: CPT

## 2019-05-23 PROCEDURE — 36415 COLL VENOUS BLD VENIPUNCTURE: CPT

## 2019-07-31 ENCOUNTER — OFFICE VISIT (OUTPATIENT)
Dept: BEHAVIORAL HEALTH | Facility: CLINIC | Age: 54
End: 2019-07-31
Payer: COMMERCIAL

## 2019-07-31 VITALS
HEIGHT: 68 IN | SYSTOLIC BLOOD PRESSURE: 164 MMHG | DIASTOLIC BLOOD PRESSURE: 82 MMHG | BODY MASS INDEX: 31.22 KG/M2 | HEART RATE: 76 BPM | WEIGHT: 206 LBS

## 2019-07-31 DIAGNOSIS — F41.9 ANXIETY DISORDER, UNSPECIFIED TYPE: ICD-10-CM

## 2019-07-31 DIAGNOSIS — F22 PARANOIA (HCC): ICD-10-CM

## 2019-07-31 DIAGNOSIS — F32.A DEPRESSIVE DISORDER: ICD-10-CM

## 2019-07-31 DIAGNOSIS — F51.05 INSOMNIA SECONDARY TO ANXIETY: ICD-10-CM

## 2019-07-31 DIAGNOSIS — F41.9 INSOMNIA SECONDARY TO ANXIETY: ICD-10-CM

## 2019-07-31 PROCEDURE — 99214 OFFICE O/P EST MOD 30 MIN: CPT | Performed by: PSYCHIATRY & NEUROLOGY

## 2019-07-31 RX ORDER — ESCITALOPRAM OXALATE 10 MG/1
10 TABLET ORAL DAILY
Qty: 90 TAB | Refills: 0 | Status: SHIPPED | OUTPATIENT
Start: 2019-07-31 | End: 2019-10-07 | Stop reason: SDUPTHER

## 2019-07-31 RX ORDER — RISPERIDONE 1 MG/1
1 TABLET ORAL
Qty: 90 TAB | Refills: 0 | Status: SHIPPED | OUTPATIENT
Start: 2019-07-31 | End: 2019-10-07 | Stop reason: SDUPTHER

## 2019-07-31 RX ORDER — CLONAZEPAM 0.5 MG/1
0.5 TABLET ORAL
COMMUNITY
End: 2019-08-06 | Stop reason: SDUPTHER

## 2019-07-31 RX ORDER — RISPERIDONE 1 MG/1
1 TABLET ORAL
Qty: 180 TAB | Refills: 0 | Status: SHIPPED | OUTPATIENT
Start: 2019-07-31 | End: 2019-07-31 | Stop reason: SDUPTHER

## 2019-07-31 NOTE — PROGRESS NOTES
PSYCHIATRY FOLLOW-UP NOTE      Chief Complaint   Patient presents with   • Follow-Up     anxiety, paranoia         History Of Present Illness:  Shama Paula is a 54 y.o. old female with hypothyroidism, anxiety, paranoia comes in today for follow up, was last seen 4 months ago.  She reports having some ups and downs since her last appointment with me.  She is trying to distract herself from her thoughts were taking on as much as she can.  She has been spending more time with her kids and has taken on golfing as a new hobby to keep herself distracted.  She states that she still has the belief that somebody is following her or she is being watched and it is more than a fear for her.  She is no longer able to hike as she is worried that somebody is there to hurt her.  She started a new job and has been traveling a little bit more.  She still feels that somebody is following her when she is driving back home but is no longer changing her routes and she is okay with that.  She has been able to use a little bit more of Internet at home.  She however still thinks more into people's expressions and tries to find hidden messages.  She feels that Lexapro and Risperdal have helped to calm down her anxiety as well as her thinking a little bit.  She is noticed that she is obsessing less on her thoughts and distraction is easy for her.  She still struggles with insomnia at times but tries to avoid taking Klonopin as much as she can.  She rarely uses Klonopin to calm down her anxiety and help her with sleep.  She and her  are having some good and bad days in regards to their marriage and her thinking couples counseling.  She has noticed that when she is feeling anxious she feels depressed along with it.  She denies having thoughts of wanting to hurt herself or others.    Social History:   She is  and has 2 sons and lives with her family in [place].  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    Substance Use:  Alcohol - Denies  Nicotine - Denies  Illicit drugs - Denies    Past Medication Trials:  None    Medications:  Current Outpatient Medications   Medication Sig Dispense Refill   • clonazePAM (KLONOPIN) 0.5 MG Tab Take 0.5 mg by mouth 1 time daily as needed.     • escitalopram (LEXAPRO) 10 MG Tab Take 1 Tab by mouth every day for 90 days. 90 Tab 0   • risperiDONE (RISPERDAL) 1 MG Tab Take 1 Tab by mouth every bedtime for 90 days. 90 Tab 0   • estradiol (ESTRACE) 0.1 MG/GM vaginal cream 2 g PV daily x 1 week then 2 g PV 1-2 times weekly 1 Tube 1   • estradiol (ESTRING) 2 MG vaginal ring 1 ring PV every 3 months (Patient not taking: Reported on 7/31/2019) 1 Each 3     No current facility-administered medications for this visit.        Review Of Systems:    Constitutional - Negative for fatigue  Respiratory - Negative for shortness of breath, cough  CVS - Negative for chest pain, palpitations  GI - Negative for nausea, vomiting, abdominal pain, diarrhea, constipation  Musculoskeletal - Negative for back pain  Neurological - Negative for headaches  Psychiatric - Positive for anxiety, depression, paranoia, poor sleep    Physical Examination:  Vital signs: BP (!) 164/82   Pulse 76   Wt 93.4 kg (206 lb)   LMP 12/06/2012   BMI 31.32 kg/m²     Musculoskeletal: Normal gait. No abnormal movements.     Mental Status Evaluation:   General: Middle aged white female, dressed in casual attire, good grooming and hygiene, in no apparent distress, calm and cooperative, good eye contact, no psychomotor agitation or retardation  Orientation: Alert and oriented to person, place and time  Recent and remote memory: Grossly intact  Attention span and concentration: Grossly intact  Speech: Spontaneous, normal rate, rhythm and tone  Thought Process: Linear, logical and goal directed  Thought Content: Denies suicidal or homicidal ideations, intent or plan  Perception:  "Denies auditory or visual hallucinations. Paranoid delusions noted  Associations: Intact  Language: Appropriate  Fund of knowledge and vocabulary: Grossly adequate  Mood: \"ups and down\"  Affect: Anxious, mood congruent  Insight: Partial   Judgment: Good    Depression screening:  Depression Screen (PHQ-2/PHQ-9) 12/18/2018 12/24/2018 4/26/2019   PHQ-2 Total Score - - -   PHQ-2 Total Score 0 2 0   PHQ-9 Total Score - 8 -     Interpretation of PHQ-9 Total Score   Score Severity   1-4 No Depression   5-9 Mild Depression   10-14 Moderate Depression   15-19 Moderately Severe Depression   20-27 Severe Depression    Medical Records/Labs/Diagnostic Tests Reviewed:  [facility]  records - appropriate refills, no abuse suspected       Impression:  1.  Paranoia (likely delusional disorder) - improving   2.  Anxiety disorder (likely secondary to paranoia) - improving  3.  Depressive disorder (likely secondary to anxiety and paranoia) - new problem  4.  Insomnia secondary to anxiety and paranoia - stable    Plan:  1.  Increase Risperdal to 2 mg at bedtime for paranoia.  She will try to take 2 mg for a week and if she notices worsening nasal congestion she plans on cutting her dose back to 1 mg.  - AIMS 0 (1/2019)   - 3-month metabolic monitoring (5/2019): lipid panel normal, A1c elevated at 5.9  - 12 month metabolic monitoring labs due in 5/2020  2.  Continue Lexapro 10 mg daily for anxiety  3.  Continue Klonopin 0.5 mg at bedtime as needed for anxiety and/or sleep.  Not refilled today.  4.  Continue individual psychotherapy with Dr. João Venegas, Ph.D    Return to clinic in 6 weeks or sooner if symptoms worsen    The proposed treatment plan was discussed with the patient who was provided the opportunity to ask questions and make suggestions regarding alternative treatment. Patient verbalized understanding and expressed agreement with the plan.     Camelia Azul M.D.  07/31/19    This note was created using voice recognition software " (Jennifer). The accuracy of the dictation is limited by the abilities of the software. I have reviewed the note prior to signing, however some errors in grammar and context are still possible. If you have any questions related to this note please do not hesitate to contact our office.

## 2019-08-06 ENCOUNTER — PATIENT MESSAGE (OUTPATIENT)
Dept: BEHAVIORAL HEALTH | Facility: CLINIC | Age: 54
End: 2019-08-06

## 2019-08-06 DIAGNOSIS — F41.9 ANXIETY DISORDER, UNSPECIFIED TYPE: ICD-10-CM

## 2019-08-06 DIAGNOSIS — F41.9 INSOMNIA SECONDARY TO ANXIETY: ICD-10-CM

## 2019-08-06 DIAGNOSIS — F51.05 INSOMNIA SECONDARY TO ANXIETY: ICD-10-CM

## 2019-08-06 RX ORDER — CLONAZEPAM 0.5 MG/1
0.5 TABLET ORAL
Qty: 30 TAB | Refills: 0 | Status: SHIPPED
Start: 2019-08-06 | End: 2019-09-05

## 2019-08-26 ENCOUNTER — OFFICE VISIT (OUTPATIENT)
Dept: URGENT CARE | Facility: CLINIC | Age: 54
End: 2019-08-26
Payer: COMMERCIAL

## 2019-08-26 ENCOUNTER — HOSPITAL ENCOUNTER (OUTPATIENT)
Facility: MEDICAL CENTER | Age: 54
End: 2019-08-26
Attending: NURSE PRACTITIONER
Payer: COMMERCIAL

## 2019-08-26 VITALS
BODY MASS INDEX: 31.07 KG/M2 | HEIGHT: 68 IN | WEIGHT: 205 LBS | SYSTOLIC BLOOD PRESSURE: 120 MMHG | TEMPERATURE: 98.5 F | OXYGEN SATURATION: 96 % | RESPIRATION RATE: 18 BRPM | DIASTOLIC BLOOD PRESSURE: 80 MMHG | HEART RATE: 78 BPM

## 2019-08-26 DIAGNOSIS — N30.90 CYSTITIS: ICD-10-CM

## 2019-08-26 DIAGNOSIS — R30.0 DYSURIA: ICD-10-CM

## 2019-08-26 LAB
APPEARANCE UR: NORMAL
BILIRUB UR STRIP-MCNC: NORMAL MG/DL
COLOR UR AUTO: NORMAL
GLUCOSE UR STRIP.AUTO-MCNC: NORMAL MG/DL
KETONES UR STRIP.AUTO-MCNC: NORMAL MG/DL
LEUKOCYTE ESTERASE UR QL STRIP.AUTO: NORMAL
NITRITE UR QL STRIP.AUTO: NORMAL
PH UR STRIP.AUTO: 6 [PH] (ref 5–8)
PROT UR QL STRIP: 100 MG/DL
RBC UR QL AUTO: NORMAL
SP GR UR STRIP.AUTO: 1.03
UROBILINOGEN UR STRIP-MCNC: 0.2 MG/DL

## 2019-08-26 PROCEDURE — 99214 OFFICE O/P EST MOD 30 MIN: CPT | Performed by: NURSE PRACTITIONER

## 2019-08-26 PROCEDURE — 99000 SPECIMEN HANDLING OFFICE-LAB: CPT | Performed by: NURSE PRACTITIONER

## 2019-08-26 PROCEDURE — 87086 URINE CULTURE/COLONY COUNT: CPT

## 2019-08-26 PROCEDURE — 81002 URINALYSIS NONAUTO W/O SCOPE: CPT | Performed by: NURSE PRACTITIONER

## 2019-08-26 PROCEDURE — 87186 SC STD MICRODIL/AGAR DIL: CPT

## 2019-08-26 PROCEDURE — 87077 CULTURE AEROBIC IDENTIFY: CPT

## 2019-08-26 RX ORDER — SULFAMETHOXAZOLE AND TRIMETHOPRIM 800; 160 MG/1; MG/1
1 TABLET ORAL 2 TIMES DAILY
Qty: 10 TAB | Refills: 0 | Status: SHIPPED | OUTPATIENT
Start: 2019-08-26 | End: 2019-08-31

## 2019-08-26 RX ORDER — CEFDINIR 300 MG/1
300 CAPSULE ORAL 2 TIMES DAILY
Qty: 10 CAP | Refills: 0 | Status: SHIPPED | OUTPATIENT
Start: 2019-08-26 | End: 2019-08-31

## 2019-08-26 ASSESSMENT — ENCOUNTER SYMPTOMS
FEVER: 0
CHILLS: 0
FLANK PAIN: 0

## 2019-08-26 NOTE — PROGRESS NOTES
Subjective:      Shama Paula is a 54 y.o. female who presents with Dysuria (Today urinary pain .)    Past Medical History:   Diagnosis Date   • Anemia 5/12/2009   • Anxiety    • Atrophic vaginitis 8/25/2015    Sees Greg.    • Celiac disease    • Celiac sprue 5/12/2009   • Colon polyp 5/12/2009   • Depression    • Elevated TSH 12/8/2010   • ENDOMETRIOSIS 5/12/2009   • Heart palpitations     COMES ON WITH STRESS   • Ovarian cyst 5/12/2009   • Pancreatic cyst 6/17/2009   • Pancreatitis      RECENT HOSP STAY  DC ON 5/4   • Preventative health care 5/12/2009   • Recurrent UTI 5/12/2009   • Tubal ligation      Social History     Socioeconomic History   • Marital status:      Spouse name: Not on file   • Number of children: Not on file   • Years of education: Not on file   • Highest education level: Not on file   Occupational History   • Not on file   Social Needs   • Financial resource strain: Not on file   • Food insecurity:     Worry: Not on file     Inability: Not on file   • Transportation needs:     Medical: Not on file     Non-medical: Not on file   Tobacco Use   • Smoking status: Former Smoker     Years: 5.00   • Smokeless tobacco: Never Used   • Tobacco comment: in her 20's   Substance and Sexual Activity   • Alcohol use: Yes     Alcohol/week: 0.0 oz     Comment: social drinker    • Drug use: No   • Sexual activity: Yes     Partners: Male     Comment: tubal ligation   Lifestyle   • Physical activity:     Days per week: Not on file     Minutes per session: Not on file   • Stress: Not on file   Relationships   • Social connections:     Talks on phone: Not on file     Gets together: Not on file     Attends Rastafari service: Not on file     Active member of club or organization: Not on file     Attends meetings of clubs or organizations: Not on file     Relationship status: Not on file   • Intimate partner violence:     Fear of current or ex partner: Not on file     Emotionally abused: Not on file      "Physically abused: Not on file     Forced sexual activity: Not on file   Other Topics Concern   • Not on file   Social History Narrative    2017: works part time  for      Family History   Problem Relation Age of Onset   • Diabetes Mother    • Arrythmia Mother         pacemaker   • Thyroid Father    • Arthritis Sister         RA       Allergies: Dulcolax; Gluten meal; and Nkda [no known drug allergy]    Patient is a 54-year-old female who presents today with complaint of dysuria, urgency, and frequency.  Symptoms started this morning.  States she has had a long-standing history of urinary tract infections.  States Bactrim DS normally works.           Dysuria     This is a new problem. The current episode started yesterday. The problem occurs every urination. The problem has been unchanged. The quality of the pain is described as burning and aching. The pain is mild. Associated symptoms include frequency and urgency. Pertinent negatives include no chills, flank pain or hematuria. She has tried nothing for the symptoms. The treatment provided no relief.       Review of Systems   Constitutional: Negative for chills and fever.   Genitourinary: Positive for dysuria, frequency and urgency. Negative for flank pain and hematuria.   All other systems reviewed and are negative.         Objective:     /80   Pulse 78   Temp 36.9 °C (98.5 °F) (Temporal)   Resp 18   Ht 1.727 m (5' 8\")   Wt 93 kg (205 lb)   LMP 12/06/2012   SpO2 96%   BMI 31.17 kg/m²       Physical Exam   Constitutional: She is oriented to person, place, and time. She appears well-developed and well-nourished.   Cardiovascular: Normal rate, regular rhythm and normal heart sounds.   Pulmonary/Chest: Effort normal and breath sounds normal.   Abdominal: There is tenderness.   Positive suprapubic tenderness  No CVAT   Musculoskeletal: Normal range of motion.   Neurological: She is alert and oriented to person, place, and time.   Skin: " Skin is warm and dry.   Psychiatric: She has a normal mood and affect. Her behavior is normal. Judgment and thought content normal.   Vitals reviewed.    UA: positive blood, positive nitrates, positive leukocytes.    Discussed treatment with patient and advised her that I will send a prescription for Bactrim.  Patient is concerned on what she should take if the Bactrim is not effective.  Advised her that I will be doing urine culture and patient does remain concerned.  I will issue a prescription to her for Omnicef to use if her symptoms continue despite treatment with Bactrim.  Will monitor also for results of urine culture.  Patient verbalized understanding and agreement.          Assessment/Plan:     1. Dysuria    - POCT Urinalysis  - URINE CULTURE(NEW); Future  -Bactrim DS  -RX for omnicef if needed  -push fluids  -ER precautions: fever >101, n/v, flank pain, flu like symptoms.

## 2019-08-27 ENCOUNTER — OFFICE VISIT (OUTPATIENT)
Dept: BEHAVIORAL HEALTH | Facility: CLINIC | Age: 54
End: 2019-08-27
Payer: COMMERCIAL

## 2019-08-27 DIAGNOSIS — F51.05 INSOMNIA SECONDARY TO ANXIETY: ICD-10-CM

## 2019-08-27 DIAGNOSIS — F41.9 INSOMNIA SECONDARY TO ANXIETY: ICD-10-CM

## 2019-08-27 DIAGNOSIS — F22 PARANOIA (HCC): ICD-10-CM

## 2019-08-27 DIAGNOSIS — F41.9 ANXIETY DISORDER, UNSPECIFIED TYPE: ICD-10-CM

## 2019-08-27 PROCEDURE — 90834 PSYTX W PT 45 MINUTES: CPT | Performed by: PSYCHOLOGIST

## 2019-08-27 NOTE — BH THERAPY
[facility]  Therapy Progress Note    Patient Name: Shama Paula  Patient MRN: [id number]  Today's Date: 8/27/2019     Type of session:Individual psychotherapy  Length of session: 45 minutes  Persons in attendance:Patient    Subjective/New Info:   Patient is a 53 year old white female who was referred by her PCP for paranoia, anxiety and a first panic attack on 12/3/18 that took her to the ED where she refused any medication to help calm her down. Patient was able to clearly identify a precipitating event at her job at [employer] that occurred on 10/24/18 where she witnessed and feared for her boss’s life and her own due to threats that were coming to her boss. Patient continues to function fairly well with decreased anxiety; however, she continues to be quite paranoid. Talked with patient about how she might be reading into events, but she is reluctant to discontinue her paranoid interpretation of these events. She has been able to function rather normally although she remains very hypervigilant. Patient is seeking full-time employment in an attempt to get better health insurance but is seeking a job where she does not have to deal with the public. In general patient remains very suspicious but is functioning fairly well.     Patient did not present in acute distress. Patient was appropriately groomed and cooperative. Patient was alert and oriented to person, place, and time. Eye contact was appropriate. No abnormalities in attention or concentration were noted. No abnormalities of movement present; psychomotor activity was normal. Speech was fluent and regular in rhythm, rate, volume, and tone. Thought processes were linear, logical, and goal-directed with a theme of paranioa. There was no evidence of thought disorder, although it remains questionable as to the content of her paranioa. No auditory or visual hallucinations. Long and short term memory appeared to be intact. Insight, judgment,  and impulse control were deemed to be within normal limits. Reported mood was fairly positive. Affect was appropriate and congruent with thought content and conversation. Patient denied current suicidal and homicidal ideation in plan, intent, and preparatory behavior.        Diagnoses:   1. Anxiety disorder, unspecified type    2. Insomnia secondary to anxiety    3. Paranoia (HCC)         Current risk:   SUICIDE: Low   Homicide: Low   Self-harm: Low   Relapse: Not applicable   Other:    Safety Plan reviewed? Not Indicated   If evidence of imminent risk is present, intervention/plan:     Therapeutic Intervention(s): Cognitive modification, Conflict resolution skills, Distress tolerance skills, Leisure and recreation skills, Self-care skills, Stressors assessed and Supportive psychotherapy    Treatment Goal(s)/Objective(s) addressed:   Reduce Depressive symptoms:  Objective A: Patient will increase activity level by will participating in at least two hours, three times per week in a social or leisure activity with family member or close friend.  Objective B: Patient will express an increase in positive statements about self by will making at least five positive statements per day about self or circumstances.  Objective C: Patient will spend more time with peers/colleagues in social situations by spending at least thirty minutes four times per week in a social situation, interacting appropriately with a friend.      Reduce Symptoms of Anxiety:  Objective A: Patient will learn one effective technique for dealing with anxiety each week, and utilize it effectively when feeling anxious.  Objective B: Patient will express an increase in positive statements by making at least five positive statements per day about self or current circumstances.  Objective C: Patient will increase activity level by participating in at least two hours, three times per week in a leisure or social activity.         Progress toward Treatment  Goals: Mild improvement    Plan:  - Continue Individual therapy    João Venegas, Ph.D.  8/27/2019

## 2019-08-28 LAB
BACTERIA UR CULT: ABNORMAL
BACTERIA UR CULT: ABNORMAL
SIGNIFICANT IND 70042: ABNORMAL
SITE SITE: ABNORMAL
SOURCE SOURCE: ABNORMAL

## 2019-08-30 DIAGNOSIS — N30.90 CYSTITIS: ICD-10-CM

## 2019-08-30 RX ORDER — NITROFURANTOIN 25; 75 MG/1; MG/1
100 CAPSULE ORAL 2 TIMES DAILY
Qty: 10 CAP | Refills: 0 | Status: SHIPPED | OUTPATIENT
Start: 2019-08-30 | End: 2019-09-04

## 2019-09-05 ENCOUNTER — OFFICE VISIT (OUTPATIENT)
Dept: MEDICAL GROUP | Facility: MEDICAL CENTER | Age: 54
End: 2019-09-05
Payer: COMMERCIAL

## 2019-09-05 VITALS
OXYGEN SATURATION: 95 % | HEART RATE: 64 BPM | DIASTOLIC BLOOD PRESSURE: 72 MMHG | RESPIRATION RATE: 16 BRPM | WEIGHT: 205 LBS | HEIGHT: 68 IN | TEMPERATURE: 96.7 F | SYSTOLIC BLOOD PRESSURE: 120 MMHG | BODY MASS INDEX: 31.07 KG/M2

## 2019-09-05 DIAGNOSIS — N39.0 RECURRENT UTI: ICD-10-CM

## 2019-09-05 PROCEDURE — 99213 OFFICE O/P EST LOW 20 MIN: CPT | Performed by: NURSE PRACTITIONER

## 2019-09-05 RX ORDER — NITROFURANTOIN 25; 75 MG/1; MG/1
CAPSULE ORAL
Qty: 10 CAP | Refills: 3 | Status: SHIPPED | OUTPATIENT
Start: 2019-09-05 | End: 2020-02-26

## 2019-09-06 NOTE — PROGRESS NOTES
Subjective:     Chief Complaint   Patient presents with   • Other     LABS, HORMONE MEDS      Shama Paula is a 54 y.o. Female here to discuss problems with UTI. She has gotten frequent UTIs for many years, typically triggered by intercourse. She had not been sexually active for quite a while until recently- she is using e-string and now able to have relations with her  without pain. Unfortunately, she is now having difficulty with UTI again. In the past she had taken a 1 time dose of oral abx following sexual intercourse and this had worked well for her, she is requesting a prescription for this purpose.  Recent urine culture reviewed, currently on medication and symptoms resolved. No current dysuria, hematuria, back pain, fever, lower abd pain    No problem-specific Assessment & Plan notes found for this encounter.       Current medicines (including changes today)  Current Outpatient Medications   Medication Sig Dispense Refill   • nitrofurantoin monohyd macro (MACROBID) 100 MG Cap 1 tab PO once following intercourse 10 Cap 3   • clonazePAM (KLONOPIN) 0.5 MG Tab Take 1 Tab by mouth 1 time daily as needed (anxiety, insomnia) for up to 30 days. 30 Tab 0   • escitalopram (LEXAPRO) 10 MG Tab Take 1 Tab by mouth every day for 90 days. 90 Tab 0   • risperiDONE (RISPERDAL) 1 MG Tab Take 1 Tab by mouth every bedtime for 90 days. 90 Tab 0   • estradiol (ESTRING) 2 MG vaginal ring 1 ring PV every 3 months 1 Each 3   • estradiol (ESTRACE) 0.1 MG/GM vaginal cream 2 g PV daily x 1 week then 2 g PV 1-2 times weekly (Patient not taking: Reported on 8/26/2019) 1 Tube 1     No current facility-administered medications for this visit.      She  has a past medical history of Anemia (5/12/2009), Anxiety, Atrophic vaginitis (8/25/2015), Celiac disease, Celiac sprue (5/12/2009), Colon polyp (5/12/2009), Depression, Elevated TSH (12/8/2010), ENDOMETRIOSIS (5/12/2009), Heart palpitations, Ovarian cyst (5/12/2009), Pancreatic cyst  "(6/17/2009), Pancreatitis, Preventative health care (5/12/2009), Recurrent UTI (5/12/2009), and Tubal ligation.    ROS included above     Objective:     /72 (BP Location: Left arm, Patient Position: Sitting, BP Cuff Size: Adult)   Pulse 64   Temp 35.9 °C (96.7 °F) (Temporal)   Resp 16   Ht 1.727 m (5' 8\")   Wt 93 kg (205 lb)   SpO2 95%  Body mass index is 31.17 kg/m².     Physical Exam:  General: Alert, oriented in no acute distress.  Eye contact is good, speech is normal, affect calm  Lungs: clear to auscultation bilaterally, normal effort, no wheeze/ rhonchi/ rales.  CV: regular rate and rhythm, S1, S2, no murmur  Abdomen: soft, nontender, No CVAT  Ext: no edema, color normal, vascularity normal, temperature normal    Assessment and Plan:   The following treatment plan was discussed   1. Recurrent UTI  Recurrent problem triggered by intercourse. She may take 1 dose of medication following sexual relations. Encouraged to empty bladder, stay hydrated. F/u for further concerns  nitrofurantoin monohyd macro (MACROBID) 100 MG Cap       Followup: as needed         Please note that this dictation was created using voice recognition software. I have worked with consultants from the vendor as well as technical experts from [facility] [employer] [facility] [employer] to optimize the interface. I have made every reasonable attempt to correct obvious errors, but I expect that there are errors of grammar and possibly content that I did not discover before finalizing the note.       "

## 2019-09-09 ENCOUNTER — APPOINTMENT (OUTPATIENT)
Dept: BEHAVIORAL HEALTH | Facility: CLINIC | Age: 54
End: 2019-09-09
Payer: COMMERCIAL

## 2019-10-07 ENCOUNTER — OFFICE VISIT (OUTPATIENT)
Dept: BEHAVIORAL HEALTH | Facility: CLINIC | Age: 54
End: 2019-10-07
Payer: COMMERCIAL

## 2019-10-07 VITALS
BODY MASS INDEX: 31.07 KG/M2 | HEIGHT: 68 IN | SYSTOLIC BLOOD PRESSURE: 130 MMHG | WEIGHT: 205 LBS | DIASTOLIC BLOOD PRESSURE: 78 MMHG | HEART RATE: 73 BPM

## 2019-10-07 DIAGNOSIS — F41.9 INSOMNIA SECONDARY TO ANXIETY: ICD-10-CM

## 2019-10-07 DIAGNOSIS — F41.9 ANXIETY DISORDER, UNSPECIFIED TYPE: ICD-10-CM

## 2019-10-07 DIAGNOSIS — F51.05 INSOMNIA SECONDARY TO ANXIETY: ICD-10-CM

## 2019-10-07 DIAGNOSIS — F32.A DEPRESSIVE DISORDER: ICD-10-CM

## 2019-10-07 DIAGNOSIS — F22 PARANOIA (HCC): ICD-10-CM

## 2019-10-07 PROCEDURE — 99214 OFFICE O/P EST MOD 30 MIN: CPT | Performed by: PSYCHIATRY & NEUROLOGY

## 2019-10-07 RX ORDER — ESCITALOPRAM OXALATE 10 MG/1
10 TABLET ORAL DAILY
Qty: 90 TAB | Refills: 1 | Status: SHIPPED | OUTPATIENT
Start: 2019-10-07 | End: 2019-12-19

## 2019-10-07 RX ORDER — RISPERIDONE 1 MG/1
1 TABLET ORAL
Qty: 90 TAB | Refills: 1 | Status: SHIPPED | OUTPATIENT
Start: 2019-10-07 | End: 2020-01-05

## 2019-10-07 NOTE — PROGRESS NOTES
PSYCHIATRY FOLLOW-UP NOTE      Chief Complaint   Patient presents with   • Follow-Up     anxiety, paranoia         History Of Present Illness:  Shama Paula is a 54 y.o. old female with hypothyroidism, anxiety, paranoia comes in today for follow up, was last seen over 2 months ago.  She has been feeling good since her last visit with me.  She was unable to increase her dose of Risperdal to 2 mg.  She tried a few times but ended up having nasal congestion and feels comfortable taking only 1 mg.  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  She had that job for 2 weeks but felt that the company had unrealistic expectations so she quit the job last week.  She did contact her previous job and is waiting to hear from them.  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  She is feeling less anxious with driving.  She still has some anxiety and paranoia about people but it is getting better.  She and her  are doing better in regards to the relationship as well.  Sleep is good 5 nights out of 6 and she uses Klonopin on very infrequent basis to help with overwhelming anxiety or sleep.  She is noting good mood symptoms as well.  She denies having thoughts of wanting to hurt herself or others.    Social History:   She is  and has 2 sons and lives with her family in [place].  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    Substance Use:  Alcohol - Denies  Nicotine - Denies  Illicit drugs - Denies    Past Medication Trials:  None    Medications:  Current Outpatient Medications   Medication Sig Dispense Refill   • nitrofurantoin monohyd macro (MACROBID) 100 MG Cap 1 tab PO once following intercourse 10 Cap 3   • escitalopram (LEXAPRO) 10 MG Tab Take 1 Tab by mouth every day for 90 days. 90 Tab 0   • risperiDONE (RISPERDAL) 1 MG  "Tab Take 1 Tab by mouth every bedtime for 90 days. 90 Tab 0   • estradiol (ESTRACE) 0.1 MG/GM vaginal cream 2 g PV daily x 1 week then 2 g PV 1-2 times weekly (Patient not taking: Reported on 8/26/2019) 1 Tube 1   • estradiol (ESTRING) 2 MG vaginal ring 1 ring PV every 3 months (Patient not taking: Reported on 10/7/2019) 1 Each 3     No current facility-administered medications for this visit.        Review Of Systems:    Constitutional - Negative for fatigue  Respiratory - Negative for shortness of breath, cough  CVS - Negative for chest pain, palpitations  GI - Negative for nausea, vomiting, abdominal pain, diarrhea, constipation  Musculoskeletal - Negative for back pain  Neurological - Negative for headaches  Psychiatric - Positive for anxiety, paranoia, poor sleep    Physical Examination:  Vital signs: Pulse 73   Ht 1.727 m (5' 8\")   Wt 93 kg (205 lb)   LMP 12/06/2012   BMI 31.17 kg/m²     Musculoskeletal: Normal gait. No abnormal movements.     Mental Status Evaluation:   General: Middle aged white female, dressed in casual attire, good grooming and hygiene, in no apparent distress, calm and cooperative, good eye contact, no psychomotor agitation or retardation  Orientation: Alert and oriented to person, place and time  Recent and remote memory: Grossly intact  Attention span and concentration: Grossly intact  Speech: Spontaneous, normal rate, rhythm and tone  Thought Process: Linear, logical and goal directed  Thought Content: Denies suicidal or homicidal ideations, intent or plan  Perception: Denies auditory or visual hallucinations. No paranoid delusions noted  Associations: Intact  Language: Appropriate  Fund of knowledge and vocabulary: Grossly adequate  Mood: \"fine\"  Affect: Euthymic, mood congruent  Insight: Partial   Judgment: Good    Depression screening:  Depression Screen (PHQ-2/PHQ-9) 12/18/2018 12/24/2018 4/26/2019   PHQ-2 Total Score - - -   PHQ-2 Total Score 0 2 0   PHQ-9 Total Score - 8 - "     Interpretation of PHQ-9 Total Score   Score Severity   1-4 No Depression   5-9 Mild Depression   10-14 Moderate Depression   15-19 Moderately Severe Depression   20-27 Severe Depression    Medical Records/Labs/Diagnostic Tests Reviewed:  [facility]  records - appropriate refills, no abuse suspected       Impression:  1.  Paranoia (likely delusional disorder) - stable  2.  Anxiety disorder (likely secondary to paranoia) - stable  3.  Depressive disorder (likely secondary to anxiety and paranoia) - stable  4.  Insomnia secondary to anxiety and paranoia - stable    Plan:  1.  Continue Risperdal 1 mg at bedtime for paranoia.  - AIMS 0 (1/2019)   - 3-month metabolic monitoring (5/2019): lipid panel normal, A1c elevated at 5.9  - 12 month metabolic monitoring labs due in 5/2020  2.  Continue Lexapro 10 mg daily for anxiety  3.  Continue Klonopin 0.5 mg at bedtime as needed for anxiety and/or sleep.  Not refilled today.  4.  Continue individual psychotherapy with MICHAEL Jeter-I    Return to clinic in 2 months or sooner if symptoms worsen    The proposed treatment plan was discussed with the patient who was provided the opportunity to ask questions and make suggestions regarding alternative treatment. Patient verbalized understanding and expressed agreement with the plan.     Camelia Azul M.D.  10/07/19    This note was created using voice recognition software (Dragon). The accuracy of the dictation is limited by the abilities of the software. I have reviewed the note prior to signing, however some errors in grammar and context are still possible. If you have any questions related to this note please do not hesitate to contact our office.

## 2020-02-05 ENCOUNTER — OFFICE VISIT (OUTPATIENT)
Dept: BEHAVIORAL HEALTH | Facility: CLINIC | Age: 55
End: 2020-02-05
Payer: COMMERCIAL

## 2020-02-05 VITALS
HEART RATE: 87 BPM | SYSTOLIC BLOOD PRESSURE: 146 MMHG | WEIGHT: 202 LBS | DIASTOLIC BLOOD PRESSURE: 71 MMHG | HEIGHT: 68 IN | BODY MASS INDEX: 30.62 KG/M2

## 2020-02-05 DIAGNOSIS — F41.9 ANXIETY DISORDER, UNSPECIFIED TYPE: ICD-10-CM

## 2020-02-05 DIAGNOSIS — F32.A DEPRESSIVE DISORDER: ICD-10-CM

## 2020-02-05 DIAGNOSIS — F22 PARANOIA (HCC): ICD-10-CM

## 2020-02-05 DIAGNOSIS — F41.9 INSOMNIA SECONDARY TO ANXIETY: ICD-10-CM

## 2020-02-05 DIAGNOSIS — F51.05 INSOMNIA SECONDARY TO ANXIETY: ICD-10-CM

## 2020-02-05 PROCEDURE — 99214 OFFICE O/P EST MOD 30 MIN: CPT | Performed by: PSYCHIATRY & NEUROLOGY

## 2020-02-05 RX ORDER — CLONAZEPAM 0.5 MG/1
0.25 TABLET ORAL
Qty: 30 TAB | Refills: 0 | Status: SHIPPED
Start: 2020-02-05 | End: 2020-03-06

## 2020-02-05 RX ORDER — ARIPIPRAZOLE 5 MG/1
5 TABLET ORAL
Qty: 90 TAB | Refills: 0 | Status: SHIPPED | OUTPATIENT
Start: 2020-02-05

## 2020-02-05 RX ORDER — TRAZODONE HYDROCHLORIDE 50 MG/1
25-50 TABLET ORAL
Qty: 90 TAB | Refills: 0 | Status: SHIPPED | OUTPATIENT
Start: 2020-02-05

## 2020-02-05 RX ORDER — ESCITALOPRAM OXALATE 10 MG/1
10 TABLET ORAL DAILY
Qty: 90 TAB | Refills: 0 | Status: SHIPPED | OUTPATIENT
Start: 2020-02-05

## 2020-02-05 ASSESSMENT — ABNORMAL INVOLUNTARY MOVEMENT SCALE (AIMS)
FACIAL_EXPRESSION_MUSCLES: NONE, NORMAL
LIPS_PARIETAL: NONE, NORMAL
LOWER_BODY_EXTREMITIES: NONE, NORMAL
TONGUE: NONE, NORMAL
NECK_SHOULDER_HIPS: NONE, NORMAL
JAW: NONE, NORMAL
AIMS_SEVERITY: 0
UPPER_BODY_EXTREMITIES: NONE, NORMAL
CURRENT_PROBLEMS_TEETH_DENTURES: NO
AIMS_PATIENT_AWARENESS: NO AWARENESS
AIMS_PATIENT_INCAPACITATION: NONE, NORMAL
PATIENT_WEARS_DENTURES: NO

## 2020-02-05 ASSESSMENT — PATIENT HEALTH QUESTIONNAIRE - PHQ9: CLINICAL INTERPRETATION OF PHQ2 SCORE: 0

## 2020-02-05 NOTE — PROGRESS NOTES
PSYCHIATRY FOLLOW-UP NOTE      Chief Complaint   Patient presents with   • Follow-Up     anxiety, paranoia, sleep         History Of Present Illness:  Shama Paula is a 54 y.o. old female with hypothyroidism, anxiety, paranoia comes in today for follow up, was last seen 4 months ago.  She has been doing all right in regards to her anxiety and depression since her last visit with me.  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  She has been keeping herself busy with getting her business going which has been a good distraction for her.  She still has a lot of anxiety over security and her safety especially when she is driving.  She does tend to get more anxious when she is driving and if somebody follows her she does struggle with anxiety and insomnia for the next few days.  She is not taking her Risperdal on daily basis as she feels that it has contributed to nasal congestion.  She does take it when she is anticipating anxiety especially after her job interviews.  She is also active in the next door domi and is trying to get the neighborhood together for improving security in her neighborhood.  She is compliant with Lexapro and does endorse benefit on her anxiety and mood symptoms.  She denies any new stressors in her marriage.  She continues to use Klonopin on very infrequent basis to calm down her anxiety and does not endorse insomnia as a side effect.  She denies any recent thoughts of wanting to hurt herself or others.    Social History:   She is  and has 2 sons and lives with her family in [place].  She works on part-time basis at a local 's office.    Substance Use:  Alcohol - Denies  Nicotine - Denies  Illicit drugs - Denies    Past Medication Trials:  Risperidone (effective, s/e - nasal congestion)     Medications:  Current Outpatient Medications   Medication Sig Dispense Refill  "  • traZODone (DESYREL) 50 MG Tab Take 0.5-1 Tabs by mouth at bedtime as needed for Sleep. 90 Tab 0   • aripiprazole (ABILIFY) 5 MG tablet Take 1 Tab by mouth every bedtime. 90 Tab 0   • escitalopram (LEXAPRO) 10 MG Tab Take 1 Tab by mouth every day. 90 Tab 0   • clonazePAM (KLONOPIN) 0.5 MG Tab Take 0.5 Tabs by mouth 1 time daily as needed (anxiety) for up to 30 days. 30 Tab 0   • nitrofurantoin monohyd macro (MACROBID) 100 MG Cap 1 tab PO once following intercourse 10 Cap 3   • estradiol (ESTRING) 2 MG vaginal ring 1 ring PV every 3 months 1 Each 3   • estradiol (ESTRACE) 0.1 MG/GM vaginal cream 2 g PV daily x 1 week then 2 g PV 1-2 times weekly (Patient not taking: Reported on 8/26/2019) 1 Tube 1     No current facility-administered medications for this visit.        Review Of Systems:    Constitutional - Negative for fatigue  Respiratory - Negative for shortness of breath, cough  CVS - Negative for chest pain, palpitations  GI - Negative for nausea, vomiting, abdominal pain, diarrhea, constipation  Musculoskeletal - Negative for back pain  Neurological - Negative for headaches  Psychiatric - Positive for anxiety, paranoia, poor sleep    Physical Examination:  Vital signs: /71   Pulse 87   Ht 1.727 m (5' 8\")   Wt 91.6 kg (202 lb)   LMP 12/06/2012   BMI 30.71 kg/m²     Musculoskeletal: Normal gait. No abnormal movements.     Mental Status Evaluation:   General: Middle aged white female, dressed in casual attire, good grooming and hygiene, in no apparent distress, calm and cooperative, good eye contact, no psychomotor agitation or retardation  Orientation: Alert and oriented to person, place and time  Recent and remote memory: Grossly intact  Attention span and concentration: Grossly intact  Speech: Spontaneous, normal rate, rhythm and tone  Thought Process: Linear, logical and goal directed  Thought Content: Denies suicidal or homicidal ideations, intent or plan  Perception: Denies auditory or visual " "hallucinations. No paranoid delusions noted  Associations: Intact  Language: Appropriate  Fund of knowledge and vocabulary: Grossly adequate  Mood: \"good\"  Affect: Euthymic, mood congruent  Insight: Partial   Judgment: Good    Depression screening:  Depression Screen (PHQ-2/PHQ-9) 12/24/2018 4/26/2019 2/5/2020   PHQ-2 Total Score - - -   PHQ-2 Total Score 2 0 0   PHQ-9 Total Score 8 - -     Interpretation of PHQ-9 Total Score   Score Severity   1-4 No Depression   5-9 Mild Depression   10-14 Moderate Depression   15-19 Moderately Severe Depression   20-27 Severe Depression    Medical Records/Labs/Diagnostic Tests Reviewed:  [facility]  records - appropriate refills, no abuse suspected       Impression:  1.  Anxiety disorder, unspecified type (likely secondary to paranoia) - stable  2.  Depressive disorder (likely secondary to paranoia) - stable  3.  Paranoia (rule out delusional disorder) - stable  4.  Insomnia secondary to anxiety and paranoia - stable    Plan:  1.  Discontinue Risperdal due to side effects of nasal congestion  2.  Start Abilify 5 mg at bedtime for paranoia  - AIMS 0 today  - 3-month metabolic monitoring (5/2019): lipid panel normal, A1c elevated at 5.9  - 12 month metabolic monitoring labs due in 5/2020  3.  Continue Lexapro 10 mg daily for anxiety and depression  4.  Continue Klonopin 0.5 mg at bedtime as needed for anxiety  5.  Start Trazodone 25 to 50 mg as needed for sleep  6.  Continue as needed individual psychotherapy with KETURAH Jeter    Return to clinic in 2 months or sooner if symptoms worsen    The proposed treatment plan was discussed with the patient who was provided the opportunity to ask questions and make suggestions regarding alternative treatment. Patient verbalized understanding and expressed agreement with the plan.     Camelia Azul M.D.  02/05/20    This note was created using voice recognition software (Dragon). The accuracy of the dictation is limited by the abilities " of the software. I have reviewed the note prior to signing, however some errors in grammar and context are still possible. If you have any questions related to this note please do not hesitate to contact our office.

## 2020-02-06 ENCOUNTER — OFFICE VISIT (OUTPATIENT)
Dept: MEDICAL GROUP | Facility: MEDICAL CENTER | Age: 55
End: 2020-02-06
Payer: COMMERCIAL

## 2020-02-06 ENCOUNTER — HOSPITAL ENCOUNTER (OUTPATIENT)
Facility: MEDICAL CENTER | Age: 55
End: 2020-02-06
Attending: NURSE PRACTITIONER
Payer: COMMERCIAL

## 2020-02-06 VITALS
TEMPERATURE: 97.3 F | OXYGEN SATURATION: 93 % | BODY MASS INDEX: 30.77 KG/M2 | SYSTOLIC BLOOD PRESSURE: 120 MMHG | HEIGHT: 68 IN | DIASTOLIC BLOOD PRESSURE: 84 MMHG | WEIGHT: 203 LBS | RESPIRATION RATE: 16 BRPM | HEART RATE: 73 BPM

## 2020-02-06 DIAGNOSIS — N89.8 VAGINAL DISCHARGE: ICD-10-CM

## 2020-02-06 DIAGNOSIS — N95.2 VAGINAL ATROPHY: ICD-10-CM

## 2020-02-06 DIAGNOSIS — Z23 NEED FOR INFLUENZA VACCINATION: ICD-10-CM

## 2020-02-06 LAB
CANDIDA DNA VAG QL PROBE+SIG AMP: NEGATIVE
G VAGINALIS DNA VAG QL PROBE+SIG AMP: POSITIVE
T VAGINALIS DNA VAG QL PROBE+SIG AMP: NEGATIVE

## 2020-02-06 PROCEDURE — 87480 CANDIDA DNA DIR PROBE: CPT

## 2020-02-06 PROCEDURE — 90686 IIV4 VACC NO PRSV 0.5 ML IM: CPT | Performed by: NURSE PRACTITIONER

## 2020-02-06 PROCEDURE — 87660 TRICHOMONAS VAGIN DIR PROBE: CPT

## 2020-02-06 PROCEDURE — 99213 OFFICE O/P EST LOW 20 MIN: CPT | Mod: 25 | Performed by: NURSE PRACTITIONER

## 2020-02-06 PROCEDURE — 87510 GARDNER VAG DNA DIR PROBE: CPT

## 2020-02-06 PROCEDURE — 90471 IMMUNIZATION ADMIN: CPT | Performed by: NURSE PRACTITIONER

## 2020-02-06 RX ORDER — ESTRADIOL 0.1 MG/G
CREAM VAGINAL
Qty: 1 TUBE | Refills: 5 | Status: SHIPPED | OUTPATIENT
Start: 2020-02-06

## 2020-02-06 NOTE — PROGRESS NOTES
Subjective:     Chief Complaint   Patient presents with   • Other     hormone replacement      Shama Paula is a 54 y.o. female established patient here for evaluation of increase in vaginal discharge and odor.  She has been using Estring for vaginal atrophy, finds that this helps quite a bit with her discomfort and pain with intercourse.  However, she feels that it is increasing vaginal discharge.  In the last few weeks she notices an odor.  No significant itching, pelvic pain, burning.  She is interested in switching from Estring back to Estrace cream    No problem-specific Assessment & Plan notes found for this encounter.       Current medicines (including changes today)  Current Outpatient Medications   Medication Sig Dispense Refill   • estradiol (ESTRACE) 0.1 MG/GM vaginal cream 2 g PV twice weekly 1 Tube 5   • traZODone (DESYREL) 50 MG Tab Take 0.5-1 Tabs by mouth at bedtime as needed for Sleep. 90 Tab 0   • aripiprazole (ABILIFY) 5 MG tablet Take 1 Tab by mouth every bedtime. 90 Tab 0   • escitalopram (LEXAPRO) 10 MG Tab Take 1 Tab by mouth every day. 90 Tab 0   • clonazePAM (KLONOPIN) 0.5 MG Tab Take 0.5 Tabs by mouth 1 time daily as needed (anxiety) for up to 30 days. 30 Tab 0   • nitrofurantoin monohyd macro (MACROBID) 100 MG Cap 1 tab PO once following intercourse 10 Cap 3   • estradiol (ESTRING) 2 MG vaginal ring 1 ring PV every 3 months 1 Each 3     No current facility-administered medications for this visit.      She  has a past medical history of Anemia (5/12/2009), Anxiety, Atrophic vaginitis (8/25/2015), Celiac disease, Celiac sprue (5/12/2009), Colon polyp (5/12/2009), Depression, Elevated TSH (12/8/2010), ENDOMETRIOSIS (5/12/2009), Heart palpitations, Ovarian cyst (5/12/2009), Pancreatic cyst (6/17/2009), Pancreatitis, Preventative health care (5/12/2009), Recurrent UTI (5/12/2009), and Tubal ligation.    ROS included above     Objective:     /84 (BP Location: Left arm, Patient Position:  "Sitting, BP Cuff Size: Adult)   Pulse 73   Temp 36.3 °C (97.3 °F) (Temporal)   Resp 16   Ht 1.727 m (5' 8\")   Wt 92.1 kg (203 lb)   SpO2 93%  Body mass index is 30.87 kg/m².     Physical Exam:  General: Alert, oriented in no acute distress.  Eye contact is good, speech is normal, affect calm  Lungs: clear to auscultation bilaterally, normal effort, no wheeze/ rhonchi/ rales.  CV: regular rate and rhythm, S1, S2, no murmur  Ext: no edema, color normal, vascularity normal, temperature normal    Assessment and Plan:   The following treatment plan was discussed   1. Vaginal atrophy   requesting change from Estring back to Estrace, prescription sent.  She will let me know if this seems to be working out better for her.  estradiol (ESTRACE) 0.1 MG/GM vaginal cream   2. Vaginal discharge   possible BV, swab sent.  Follow-up pending results  VAGINAL PATHOGENS DNA PANEL   3. Need for influenza vaccination  I have placed the below orders and discussed them with an approved delegating provider. The MA is performing the below orders under the direction of Dr. Crooks  Influenza Vaccine Quad Injection (PF)       Followup: pending test         Please note that this dictation was created using voice recognition software. I have worked with consultants from the vendor as well as technical experts from [facility] [employer] to optimize the interface. I have made every reasonable attempt to correct obvious errors, but I expect that there are errors of grammar and possibly content that I did not discover before finalizing the note.       "

## 2020-02-07 RX ORDER — METRONIDAZOLE 500 MG/1
500 TABLET ORAL 2 TIMES DAILY
Qty: 14 TAB | Refills: 0 | Status: SHIPPED | OUTPATIENT
Start: 2020-02-07 | End: 2020-02-26

## 2020-02-26 ENCOUNTER — HOSPITAL ENCOUNTER (OUTPATIENT)
Facility: MEDICAL CENTER | Age: 55
End: 2020-02-26
Attending: PHYSICIAN ASSISTANT
Payer: COMMERCIAL

## 2020-02-26 ENCOUNTER — APPOINTMENT (OUTPATIENT)
Dept: URGENT CARE | Facility: CLINIC | Age: 55
End: 2020-02-26
Payer: COMMERCIAL

## 2020-02-26 ENCOUNTER — OFFICE VISIT (OUTPATIENT)
Dept: URGENT CARE | Facility: MEDICAL CENTER | Age: 55
End: 2020-02-26
Payer: COMMERCIAL

## 2020-02-26 VITALS
DIASTOLIC BLOOD PRESSURE: 80 MMHG | HEART RATE: 62 BPM | RESPIRATION RATE: 16 BRPM | OXYGEN SATURATION: 95 % | TEMPERATURE: 97.3 F | SYSTOLIC BLOOD PRESSURE: 134 MMHG

## 2020-02-26 DIAGNOSIS — R30.0 DYSURIA: ICD-10-CM

## 2020-02-26 DIAGNOSIS — R39.9 UTI SYMPTOMS: ICD-10-CM

## 2020-02-26 DIAGNOSIS — R30.0 DYSURIA: Primary | ICD-10-CM

## 2020-02-26 LAB
APPEARANCE UR: NORMAL
BILIRUB UR STRIP-MCNC: NORMAL MG/DL
COLOR UR AUTO: YELLOW
GLUCOSE UR STRIP.AUTO-MCNC: NORMAL MG/DL
KETONES UR STRIP.AUTO-MCNC: NORMAL MG/DL
LEUKOCYTE ESTERASE UR QL STRIP.AUTO: NORMAL
NITRITE UR QL STRIP.AUTO: NEGATIVE
PH UR STRIP.AUTO: 6 [PH] (ref 5–8)
PROT UR QL STRIP: NEGATIVE MG/DL
RBC UR QL AUTO: NORMAL
SP GR UR STRIP.AUTO: 1.01
UROBILINOGEN UR STRIP-MCNC: 0.2 MG/DL

## 2020-02-26 PROCEDURE — 81002 URINALYSIS NONAUTO W/O SCOPE: CPT | Performed by: PHYSICIAN ASSISTANT

## 2020-02-26 PROCEDURE — 99214 OFFICE O/P EST MOD 30 MIN: CPT | Performed by: PHYSICIAN ASSISTANT

## 2020-02-26 PROCEDURE — 87086 URINE CULTURE/COLONY COUNT: CPT

## 2020-02-26 PROCEDURE — 87186 SC STD MICRODIL/AGAR DIL: CPT

## 2020-02-26 PROCEDURE — 87077 CULTURE AEROBIC IDENTIFY: CPT

## 2020-02-26 RX ORDER — NITROFURANTOIN 25; 75 MG/1; MG/1
100 CAPSULE ORAL EVERY 12 HOURS
Qty: 10 CAP | Refills: 0 | Status: SHIPPED | OUTPATIENT
Start: 2020-02-26 | End: 2020-03-02

## 2020-02-26 RX ORDER — PHENAZOPYRIDINE HYDROCHLORIDE 200 MG/1
200 TABLET, FILM COATED ORAL 3 TIMES DAILY
Qty: 6 TAB | Refills: 0 | Status: SHIPPED | OUTPATIENT
Start: 2020-02-26 | End: 2020-02-28

## 2020-02-26 NOTE — PROGRESS NOTES
Subjective:      Pt is a 55 y.o. female who presents with Cystitis (started this morning)            HPI  This is a new problem. PT comes into the UC with a chief complaint of dysuria, burning on urination, urgency, frequency, and bladder pressure x 1 day. PT denies fevers or chills, CP, SOB, NVD, paresthesias, headaches, dizziness, change in vision, hives, or joint pain. PT states the pain is a 6/10 with burning upon urination, aching in nature and worse at night. Pt states they have not taken any RX meds for this issue. Pt denies flank or back pain as well. The pt's medication list, problem list, and allergies have been evaluated and reviewed during today's visit.      PMH:  Past Medical History:   Diagnosis Date   • Anemia 5/12/2009   • Anxiety    • Atrophic vaginitis 8/25/2015    Sees Greg.    • Celiac disease    • Celiac sprue 5/12/2009   • Colon polyp 5/12/2009   • Depression    • Elevated TSH 12/8/2010   • ENDOMETRIOSIS 5/12/2009   • Heart palpitations     COMES ON WITH STRESS   • Ovarian cyst 5/12/2009   • Pancreatic cyst 6/17/2009   • Pancreatitis      RECENT HOSP STAY  DC ON 5/4   • Preventative health care 5/12/2009   • Recurrent UTI 5/12/2009   • Tubal ligation        PSH:  Past Surgical History:   Procedure Laterality Date   • COLECTOMY  2014    [facility]   • JESSIE BY LAPAROSCOPY  5/27/2009    Performed by JUSTINE MURPHY at SURGERY [facility]S   • GASTROSCOPY WITH BIOPSY  5/11/2009    Performed by HARINDER VICTOR at SURGERY [facility] ORS   • EGD WITH ASP/BX  5/11/2009    Performed by HARINDER VICTOR at SURGERY [facility] ORS   • CHOLECYSTECTOMY  2009   • PB ENLARGE BREAST WITH IMPLANT  1989   • GYN SURGERY      2 x c section   • OTHER      tonsillectomy   • PRIMARY C SECTION     • TUBAL LIGATION         Fam Hx:    family history includes Arrythmia in her mother; Arthritis in her sister; Diabetes in her mother; Thyroid in her father.  Family Status   Relation Name Status   • Mo  Alive    • Fa     • Sis  Alive   • PAcristel   at age 60        MS - onset about 42    • Son Everardo Alive, age 17y   • Son Erich Alive, age 16y       Soc HX:  Social History     Socioeconomic History   • Marital status:      Spouse name: Not on file   • Number of children: Not on file   • Years of education: Not on file   • Highest education level: Not on file   Occupational History   • Not on file   Social Needs   • Financial resource strain: Not on file   • Food insecurity     Worry: Not on file     Inability: Not on file   • Transportation needs     Medical: Not on file     Non-medical: Not on file   Tobacco Use   • Smoking status: Former Smoker     Years: 5.00   • Smokeless tobacco: Never Used   • Tobacco comment: in her 20's   Substance and Sexual Activity   • Alcohol use: Yes     Alcohol/week: 0.0 oz     Comment: social drinker    • Drug use: No   • Sexual activity: Yes     Partners: Male     Comment: tubal ligation   Lifestyle   • Physical activity     Days per week: Not on file     Minutes per session: Not on file   • Stress: Not on file   Relationships   • Social connections     Talks on phone: Not on file     Gets together: Not on file     Attends Spiritism service: Not on file     Active member of club or organization: Not on file     Attends meetings of clubs or organizations: Not on file     Relationship status: Not on file   • Intimate partner violence     Fear of current or ex partner: Not on file     Emotionally abused: Not on file     Physically abused: Not on file     Forced sexual activity: Not on file   Other Topics Concern   • Not on file   Social History Narrative    2017: works part time  for          Medications:    Current Outpatient Medications:   •  nitrofurantoin monohyd macro (MACROBID) 100 MG Cap, Take 1 Cap by mouth every 12 hours for 5 days., Disp: 10 Cap, Rfl: 0  •  phenazopyridine (PYRIDIUM) 200 MG Tab, Take 1 Tab by mouth 3 times a day for 2 days.,  Disp: 6 Tab, Rfl: 0  •  estradiol (ESTRACE) 0.1 MG/GM vaginal cream, 2 g PV twice weekly, Disp: 1 Tube, Rfl: 5  •  traZODone (DESYREL) 50 MG Tab, Take 0.5-1 Tabs by mouth at bedtime as needed for Sleep., Disp: 90 Tab, Rfl: 0  •  aripiprazole (ABILIFY) 5 MG tablet, Take 1 Tab by mouth every bedtime., Disp: 90 Tab, Rfl: 0  •  escitalopram (LEXAPRO) 10 MG Tab, Take 1 Tab by mouth every day., Disp: 90 Tab, Rfl: 0  •  clonazePAM (KLONOPIN) 0.5 MG Tab, Take 0.5 Tabs by mouth 1 time daily as needed (anxiety) for up to 30 days., Disp: 30 Tab, Rfl: 0  •  estradiol (ESTRING) 2 MG vaginal ring, 1 ring PV every 3 months, Disp: 1 Each, Rfl: 3      Allergies:  Dulcolax; Gluten meal; and Nkda [no known drug allergy]    ROS  Review of Systems   Constitutional: Negative for fever, chills and malaise/fatigue.   HENT: Negative for congestion and sore throat.    Eyes: Negative for blurred vision, double vision and photophobia.   Respiratory: Negative for cough and shortness of breath.    Cardiovascular: Negative for chest pain and palpitations.   Gastrointestinal: Negative for nausea, vomiting, abdominal pain, diarrhea and constipation.   Genitourinary: Positive for dysuria, urgency and frequency.   Musculoskeletal: Negative for joint pain and myalgias.   Skin: Negative for rash.   Neurological: Negative for dizziness, tingling and headaches.   Endo/Heme/Allergies: Does not bruise/bleed easily.   Psychiatric/Behavioral: Negative for depression. The patient is not nervous/anxious.           Objective:     /80   Pulse 62   Temp 36.3 °C (97.3 °F) (Temporal)   Resp 16   LMP 12/06/2012   SpO2 95%      Physical Exam      Physical Exam   Constitutional: She is oriented to person, place, and time. She appears well-developed and well-nourished. No distress.   HENT:   Head: Normocephalic and atraumatic.   Right Ear: External ear normal.   Left Ear: External ear normal.   Nose: Nose normal.   Mouth/Throat: Oropharynx is clear and  moist. No oropharyngeal exudate.   Eyes: Conjunctivae normal and EOM are normal. Pupils are equal, round, and reactive to light.   Neck: Normal range of motion. Neck supple. No thyromegaly present.   Cardiovascular: Normal rate, regular rhythm, normal heart sounds and intact distal pulses.  Exam reveals no gallop and no friction rub.    No murmur heard.  Pulmonary/Chest: Effort normal and breath sounds normal. No respiratory distress. She has no wheezes. She has no rales. She exhibits no tenderness.   Abdominal: Soft. Bowel sounds are normal. She exhibits no distension and no mass. There is no tenderness. There is no rebound and no guarding.   Genitourinary:        Pt deferred   Musculoskeletal: Normal range of motion. She exhibits no edema and no tenderness.   Lymphadenopathy:     She has no cervical adenopathy.   Neurological: She is alert and oriented to person, place, and time. She has normal reflexes. No cranial nerve deficit.   Skin: Skin is warm and dry. No rash noted. No erythema.   Psychiatric: She has a normal mood and affect. Her behavior is normal. Judgment and thought content normal.          Assessment/Plan:       1. UTI symptoms    - POCT Urinalysis-->LEUKS AND BLOOD    2. Dysuria    - Urine Culture; Future  - nitrofurantoin monohyd macro (MACROBID) 100 MG Cap; Take 1 Cap by mouth every 12 hours for 5 days.  Dispense: 10 Cap; Refill: 0  - phenazopyridine (PYRIDIUM) 200 MG Tab; Take 1 Tab by mouth 3 times a day for 2 days.  Dispense: 6 Tab; Refill: 0    Rest, fluids encouraged.  AVS with medical info given.  Pt was in full understanding and agreement with the plan.  Differential diagnosis, natural history, supportive care, and indications for immediate follow-up discussed. All questions answered. Patient agrees with the plan of care.  Follow-up as needed if symptoms worsen or fail to improve to PCP, Urgent care or Emergency Room.

## 2020-03-03 ENCOUNTER — TELEPHONE (OUTPATIENT)
Dept: URGENT CARE | Facility: PHYSICIAN GROUP | Age: 55
End: 2020-03-03

## 2020-03-03 NOTE — TELEPHONE ENCOUNTER
Called and left message with pt about urine culture results which came back positive for E.coli.   I told her she could continue the abx therapy with a positive urine culture which was sensitive to the abx she was placed on.  Encouraged Pt to call back with questions.  Grzegorz Cervantes PA-C

## 2020-03-04 ENCOUNTER — HOSPITAL ENCOUNTER (OUTPATIENT)
Dept: LAB | Facility: MEDICAL CENTER | Age: 55
End: 2020-03-04
Payer: COMMERCIAL

## 2023-12-11 NOTE — BH THERAPY
Group Therapy Checklist  Attendance: Attended  Attendance Duration (min):  (90 min.)  Number of Participants: 7  Program/Group: Partial Hospital Program  Topics Covered:  (Group Therapy)  Participation: Active verbal participation, Attentive, Supportive to other group members (Pt was supportive to peers no matter what they were recovering from.  She engaged in discussion of the 12-Step philosphy.)  Affect/Mood Range: Flexible  Affect/Mood Display: Congruent w/content  Cognition: Oriented, Alert  Evidence of Imminent Suicide Risk: No  Evidence of imminent homicide risk: No  Therapeutic Interventions: Cognitive clarification, Emotion clarification  Progress Toward Treatment Goal: Moderate improvement   chest soreness